# Patient Record
Sex: FEMALE | Race: WHITE | URBAN - NONMETROPOLITAN AREA
[De-identification: names, ages, dates, MRNs, and addresses within clinical notes are randomized per-mention and may not be internally consistent; named-entity substitution may affect disease eponyms.]

---

## 2017-02-20 LAB
ALT SERPL-CCNC: 42 IU/L (ref 6–31)
AST SERPL-CCNC: 58 IU/L (ref 10–40)
CREAT SERPL-MCNC: 0.72 MG/DL (ref 0.4–1)
GLUCOSE SERPL-MCNC: 119 MG/DL (ref 70–100)
INR PPP: 1.1 (ref 0.9–1.1)
POTASSIUM SERPL-SCNC: 4.4 MEQ/L (ref 3.4–5.1)

## 2017-02-21 ENCOUNTER — TRANSFERRED RECORDS (OUTPATIENT)
Dept: HEALTH INFORMATION MANAGEMENT | Facility: HOSPITAL | Age: 52
End: 2017-02-21

## 2017-02-21 ENCOUNTER — HOSPITAL ENCOUNTER (INPATIENT)
Facility: HOSPITAL | Age: 52
LOS: 3 days | Discharge: HOME | DRG: 885 | End: 2017-02-24
Attending: PSYCHIATRY & NEUROLOGY | Admitting: PSYCHIATRY & NEUROLOGY
Payer: MEDICARE

## 2017-02-21 DIAGNOSIS — F31.10 BIPOLAR I DISORDER, MOST RECENT EPISODE (OR CURRENT) MANIC (H): Primary | Chronic | ICD-10-CM

## 2017-02-21 PROCEDURE — 40000275 ZZH STATISTIC RCP TIME EA 10 MIN

## 2017-02-21 PROCEDURE — 25000132 ZZH RX MED GY IP 250 OP 250 PS 637: Mod: GY | Performed by: NURSE PRACTITIONER

## 2017-02-21 PROCEDURE — 94640 AIRWAY INHALATION TREATMENT: CPT

## 2017-02-21 PROCEDURE — 25000308 HC RX OP HPI UCR WEL MED 250 IP 250: Performed by: NURSE PRACTITIONER

## 2017-02-21 PROCEDURE — 12400000 ZZH R&B MH

## 2017-02-21 PROCEDURE — 99222 1ST HOSP IP/OBS MODERATE 55: CPT | Mod: AI | Performed by: NURSE PRACTITIONER

## 2017-02-21 PROCEDURE — A9270 NON-COVERED ITEM OR SERVICE: HCPCS | Mod: GY | Performed by: NURSE PRACTITIONER

## 2017-02-21 RX ORDER — TRAZODONE HYDROCHLORIDE 50 MG/1
50 TABLET, FILM COATED ORAL
Status: DISCONTINUED | OUTPATIENT
Start: 2017-02-21 | End: 2017-02-24 | Stop reason: HOSPADM

## 2017-02-21 RX ORDER — OLANZAPINE 10 MG/1
10 TABLET ORAL
Status: DISCONTINUED | OUTPATIENT
Start: 2017-02-21 | End: 2017-02-24 | Stop reason: HOSPADM

## 2017-02-21 RX ORDER — HYDROXYZINE HYDROCHLORIDE 25 MG/1
25-50 TABLET, FILM COATED ORAL EVERY 4 HOURS PRN
Status: DISCONTINUED | OUTPATIENT
Start: 2017-02-21 | End: 2017-02-24 | Stop reason: HOSPADM

## 2017-02-21 RX ORDER — ALBUTEROL SULFATE 0.83 MG/ML
1 SOLUTION RESPIRATORY (INHALATION) EVERY 4 HOURS PRN
COMMUNITY

## 2017-02-21 RX ORDER — TIOTROPIUM BROMIDE 18 UG/1
18 CAPSULE ORAL; RESPIRATORY (INHALATION) DAILY
Status: ON HOLD | COMMUNITY
End: 2017-08-31

## 2017-02-21 RX ORDER — DOCUSATE SODIUM 100 MG/1
100 CAPSULE, LIQUID FILLED ORAL 2 TIMES DAILY
Status: DISCONTINUED | OUTPATIENT
Start: 2017-02-21 | End: 2017-02-24 | Stop reason: HOSPADM

## 2017-02-21 RX ORDER — OLANZAPINE 10 MG/2ML
10 INJECTION, POWDER, FOR SOLUTION INTRAMUSCULAR
Status: DISCONTINUED | OUTPATIENT
Start: 2017-02-21 | End: 2017-02-24 | Stop reason: HOSPADM

## 2017-02-21 RX ORDER — ALBUTEROL SULFATE 0.83 MG/ML
1 SOLUTION RESPIRATORY (INHALATION) EVERY 4 HOURS PRN
Status: DISCONTINUED | OUTPATIENT
Start: 2017-02-21 | End: 2017-02-24 | Stop reason: HOSPADM

## 2017-02-21 RX ORDER — LITHIUM CARBONATE 300 MG/1
300 TABLET, FILM COATED, EXTENDED RELEASE ORAL EVERY 12 HOURS
Status: DISCONTINUED | OUTPATIENT
Start: 2017-02-21 | End: 2017-02-24 | Stop reason: HOSPADM

## 2017-02-21 RX ORDER — ACETAMINOPHEN 325 MG/1
650 TABLET ORAL EVERY 4 HOURS PRN
Status: DISCONTINUED | OUTPATIENT
Start: 2017-02-21 | End: 2017-02-24 | Stop reason: HOSPADM

## 2017-02-21 RX ORDER — BACLOFEN 10 MG/1
5 TABLET ORAL 3 TIMES DAILY PRN
Status: DISCONTINUED | OUTPATIENT
Start: 2017-02-21 | End: 2017-02-24 | Stop reason: HOSPADM

## 2017-02-21 RX ORDER — FLUTICASONE PROPIONATE 50 MCG
2 SPRAY, SUSPENSION (ML) NASAL DAILY
Status: DISCONTINUED | OUTPATIENT
Start: 2017-02-21 | End: 2017-02-24 | Stop reason: HOSPADM

## 2017-02-21 RX ORDER — ALENDRONATE SODIUM 70 MG/1
70 TABLET ORAL WEEKLY
Status: DISCONTINUED | OUTPATIENT
Start: 2017-02-21 | End: 2017-02-21

## 2017-02-21 RX ORDER — ALUMINA, MAGNESIA, AND SIMETHICONE 2400; 2400; 240 MG/30ML; MG/30ML; MG/30ML
30 SUSPENSION ORAL EVERY 4 HOURS PRN
Status: DISCONTINUED | OUTPATIENT
Start: 2017-02-21 | End: 2017-02-24 | Stop reason: HOSPADM

## 2017-02-21 RX ORDER — GABAPENTIN 300 MG/1
300 CAPSULE ORAL 2 TIMES DAILY
Status: DISCONTINUED | OUTPATIENT
Start: 2017-02-21 | End: 2017-02-24 | Stop reason: HOSPADM

## 2017-02-21 RX ADMIN — LITHIUM CARBONATE 300 MG: 300 TABLET, FILM COATED, EXTENDED RELEASE ORAL at 14:19

## 2017-02-21 RX ADMIN — LORATADINE AND PSEUDOEPHEDRINE 1 TABLET: 10; 240 TABLET, EXTENDED RELEASE ORAL at 14:19

## 2017-02-21 RX ADMIN — UMECLIDINIUM 1 PUFF: 62.5 AEROSOL, POWDER ORAL at 14:19

## 2017-02-21 RX ADMIN — Medication 5 MG: at 14:20

## 2017-02-21 RX ADMIN — GABAPENTIN 300 MG: 300 CAPSULE ORAL at 20:43

## 2017-02-21 RX ADMIN — VITAMIN D, TAB 1000IU (100/BT) 1000 UNITS: 25 TAB at 14:19

## 2017-02-21 RX ADMIN — DOCUSATE SODIUM 100 MG: 100 CAPSULE, LIQUID FILLED ORAL at 20:43

## 2017-02-21 RX ADMIN — OLANZAPINE 10 MG: 10 TABLET, FILM COATED ORAL at 02:13

## 2017-02-21 RX ADMIN — FLUTICASONE PROPIONATE 2 SPRAY: 50 SPRAY, METERED NASAL at 14:19

## 2017-02-21 RX ADMIN — ALBUTEROL SULFATE 2.5 MG: 2.5 SOLUTION RESPIRATORY (INHALATION) at 14:39

## 2017-02-21 ASSESSMENT — ACTIVITIES OF DAILY LIVING (ADL)
ORAL_HYGIENE: INDEPENDENT
SWALLOWING: 0-->SWALLOWS FOODS/LIQUIDS WITHOUT DIFFICULTY
DRESS: SCRUBS (BEHAVIORAL HEALTH);INDEPENDENT
GROOMING: INDEPENDENT
TOILETING: 0-->INDEPENDENT
RETIRED_EATING: 0-->INDEPENDENT
GROOMING: INDEPENDENT
AMBULATION: 0-->INDEPENDENT
COGNITION: 0 - NO COGNITION ISSUES REPORTED
FALL_HISTORY_WITHIN_LAST_SIX_MONTHS: NO
DRESS: SCRUBS (BEHAVIORAL HEALTH);INDEPENDENT
DRESS: 0-->INDEPENDENT
RETIRED_COMMUNICATION: 0-->UNDERSTANDS/COMMUNICATES WITHOUT DIFFICULTY
BATHING: 0-->INDEPENDENT
ORAL_HYGIENE: INDEPENDENT
TRANSFERRING: 0-->INDEPENDENT

## 2017-02-21 NOTE — PLAN OF CARE
Face to face end of shift report received from Baylee SAINZ RN. Rounding completed. Patient observed in her room.     Destiny Holt  2/21/2017  4:06 PM

## 2017-02-21 NOTE — PLAN OF CARE
Problem: Goal Outcome Summary  Goal: Goal Outcome Summary  Outcome: No Change  Pt denies SI HI SIB and anxiety. Pt reports depression rated a 7/10 d/t loosing her phone and bills. Pt reports she has pain to her left hip, neck and back rated a 7/10. Pt denies hallucinations but appears preoccupied. Pt cooperative with assessments    Problem: Anxiety (Adult)  Goal: Reduction/Resolution  Patient will verbalize decreased anxiety by discharge/transition of care.   Outcome: Improving  Pt denies anxiety at this time    Problem: Additional Goals: Nursing Focus  Goal: 1. Patient Goal: Nursing Focus  Pt. Will report no auditory hallucinations by discharge.   Outcome: No Change  Pt denies hallucinations but appears preoccupied  Goal: 2. Patient Goal: Nursing Focus  Pt. Will be able to obtain adequate amt of sleep at HS > 5 hrs/ day by d/c.   Outcome: Improving  Pt reports sleeping well  Goal: 3. Patient Goal: Nursing Focus  2/21/17 No weaning r/t psychosis; unpredicatble behavior. Treatment team to re-evaluate daily.   Outcome: No Change  No weaning at this time. Will reevaluate with treatment team daily

## 2017-02-21 NOTE — PHARMACY
"The following home medications were NOT continued on inpatient admission per \"Discontinuation of nonessential home medications during hospitalization\" policy: Alendronate    If a therapeutic holiday is deemed inappropriate per the prescriber, please notify the pharmacist regarding the medication order.    The pharmacist is available to answer any questions and/or concerns the patient may have regarding discontinuation of non-essential medications.    Please ensure that these medications are restarted as needed upon discharge via the medication reconciliation discharge process and included on the discharge medication reconciliation report.    Thank you,  Chiquita Keita    "

## 2017-02-21 NOTE — PROGRESS NOTES
"   02/21/17 0217   Patient Belongings   Did you bring any home meds/supplements to the hospital?  No   Patient Belongings clothing;keys;plastic bag;purse;shoes   Disposition of Belongings Belongings room   Belongings Search Yes   Clothing Search Yes   Second Staff Akila ALEXANDER   General Info Comment Black cigarrette case, tan boots, tan hospital boots, white tank top \"Free 02 Night\" printed on it, black sweat pants \"LOVE\" printed on them, brown courdoroy jacket, white BIC lighter, brown jim purse, zip lock bag with tobacco, pink flowered night shirt, black brush with brown hair band wrapped around handle, black dry erase marker, blue and white panties, black key chain with 3 keys attached, 6 Efferdent tabs in wrapper, 3 plastic prescription cards with nothing written on them.     All other belongings put in assigned cubby in belongings room.     I have reviewed my belongings list on admission and verify that it is correct.     Patient signature_______________________________    Second staff witness (if patient unable to sign) ______________________________       I have received all my belongings at discharge.    Patient signature________________________________    Sisi BUTLER  2/21/2017  2:24 AM      "

## 2017-02-21 NOTE — IP AVS SNAPSHOT
MRN:[id number]                      After Visit Summary   2/21/2017    Megan Godinez    MRN: [id number]           Thank you!     Thank you for choosing [facility] for your care. Our goal is always to provide you with excellent care. Hearing back from our patients is one way we can continue to improve our services. Please take a few minutes to complete the written survey that you may receive in the mail after you visit with us. Thank you!        Patient Information     Date Of Birth          1965        About your hospital stay     You were admitted on:  February 21, 2017 You last received care in the: [facility]    You were discharged on:  February 24, 2017       Who to Call     For medical emergencies, please call 911.  For non-urgent questions about your medical care, please call your primary care provider or clinic, [phone]          Attending Provider     Provider Specialty    Ricardo Pena MD Psychiatry    AlaLaxmi wheeler NP Licensed Mental Health       Primary Care Provider Office Phone # Fax #    Alexander Mayfield [phone]       [address] [place] [address]        Further instructions from your care team       Behavioral Discharge Planning and Instructions    Summary: Megan was admitted for hallucinations and inability for self care.    Main Diagnosis: bipolar type I disorder-current episode manic.    Major Treatments, Procedures and Findings: Stabilize with medications, connect with community programs.     Symptoms to Report: feeling more aggressive, increased confusion, losing more sleep, mood getting worse or thoughts of suicide    Lifestyle Adjustment: Take all medications as prescribed, meet with doctor/ medication provider, out patient therapist, , and [facility] worker as scheduled. Abstain from alcohol or any unprescribed drugs.     Psychiatry Follow-up:     [facility]  PCP- Alexander Mayfield  - March 10th, @ 1:45 pm.  Medication Management - Keeley Roberts - is out, meds will be managed by Dr. Mayfield until she returns.  [address] [place] [address]  Phone: [phone]   Fax: [phone]    Mobile Crisis Team/ Crisis Hotline: Call 1-396.686.4045 to reach [facility] Crisis Response.  [facility]  [address] [place], [place]  Phone: [phone]      Resources:   Crisis Intervention: 355.736.4759 or 056-879-7405 (TTY: 979.385.6658).  Call anytime for help.  National Nichols on Mental Illness (www.[place].chastity.org): 632.608.3791 or 385-849-9112.  Alcoholics Anonymous (www.alcoholics-anonymous.org): Check your phone book for your local chapter.  Suicide Awareness Voices of Education (SAVE) (www.save.org): 324-625-FBCD (9332)  National Suicide Prevention Line (www.mentalhealthmn.org): 431-961-LBEM (4697)  Mental Health Consumer/Survivor Network of [place] (www.mhcsn.net): 276.745.4445 or 278-292-8281  Mental Health Association of [place] (www.mentalhealth.org): 510.117.5991 or 035-011-3372    General Medication Instructions:   See your medication sheet(s) for instructions.   Take all medicines as directed.  Make no changes unless your doctor suggests them.   Go to all your doctor visits.  Be sure to have all your required lab tests. This way, your medicines can be refilled on time.  Do not use any drugs not prescribed by your doctor.  Avoid alcohol.    [place] Area:  [facility], [facility] stabilization [facility]- [phone]  [facility] Crisis Line: 1-198.365.9705  Advocates For Family Peace: 680-4176  Sexual Assault Program [facility]: 385.362.4342 or 1-785.838.1054  [facility]: 6-344-310-3412 Ext: 279       Calls answered Mon-Fri-8:00 am--4:30 pm    [place]:  [facility] for Family Peace: 1-596.568.8003  [facility] first call for help: 1-859.303.3177  [facility] Crisis Center:  (690) 832-2637      [place] Area:  Warm Line: 1-862.367.6295        "Calls answered Tuesday--Saturday 4:00 pm--10:00 pm  [facility] Crisis Line - 574-418-3216  [facility] 057-755-7410    [place] Statewide:  [place] Crisis and Referral Services: 1-869.676.3232  National Suicide Prevention Lifeline: 6-294-869-TALK (5667)   - rik1wifm- Text  Life  to [address]  First Call for Help:   [facility] Helpline- 1-683-SPTA-HELP     Pending Results     No orders found from 2017 to 2017.            Statement of Approval     Ordered          17 1129  I have reviewed and agree with all the recommendations and orders detailed in this document.  EFFECTIVE NOW     Approved and electronically signed by:  Laxmi Urbina NP             Admission Information     Date & Time Provider Department Dept. Phone    2017 Laxmi Urbina NP [facility] [phone]      Your Vitals Were     Blood Pressure Pulse Temperature Respirations Height Weight    118/63 83 98.2  F (36.8  C) (Tympanic) 16 1.626 m (5' 4\") 56.4 kg (124 lb 6.4 oz)    Pulse Oximetry BMI (Body Mass Index)                97% 21.35 kg/m2          [facility] [employer] Information     HangIt lets you send messages to your doctor, view your test results, renew your prescriptions, schedule appointments and more. To sign up, go to [email or web address] . Click on \"Log in\" on the left side of the screen, which will take you to the Welcome page. Then click on \"Sign up Now\" on the right side of the page.     You will be asked to enter the access code listed below, as well as some personal information. Please follow the directions to create your username and password.     Your access code is: [email or web address]  Expires: 2017 11:37 AM     Your access code will  in 90 days. If you need help or a new code, please call your [facility] clinic or [phone].        Care EveryWhere ID     This is your Care EveryWhere ID. This could be used by other organizations to access your [facility] medical records  [id number]         "   Review of your medicines      START taking        Dose / Directions    lithium 300 MG CR tablet   Commonly known as:  ESKALITH/LITHOBID   Used for:  Bipolar I disorder, most recent episode (or current) manic (H)        Dose:  300 mg   Take 1 tablet (300 mg) by mouth every 12 hours   Quantity:  60 tablet   Refills:  0         CONTINUE these medicines which may have CHANGED, or have new prescriptions. If we are uncertain of the size of tablets/capsules you have at home, strength may be listed as something that might have changed.        Dose / Directions    gabapentin 300 MG capsule   Commonly known as:  NEURONTIN   This may have changed:  medication strength   Used for:  Bipolar I disorder, most recent episode (or current) manic (H)        Dose:  300 mg   Take 1 capsule (300 mg) by mouth 2 times daily   Quantity:  60 capsule   Refills:  0         CONTINUE these medicines which have NOT CHANGED        Dose / Directions    albuterol (2.5 MG/3ML) 0.083% neb solution        Dose:  1 vial   Inhale 1 vial into the lungs every 4 hours as needed for shortness of breath / dyspnea or wheezing (1 puff q4hrs PRN)   Refills:  0       BACLOFEN PO        Dose:  5 mg   Take 5 mg by mouth 3 times daily as needed for muscle spasms   Refills:  0       fluticasone 50 MCG/ACT spray   Commonly known as:  FLONASE        Dose:  2 spray   Spray 2 sprays into both nostrils daily   Refills:  0       FOSAMAX PO        Dose:  70 mg   Take 70 mg by mouth once a week Patient takes every Sunday   Refills:  0       loratadine-pseudoePHEDrine  MG per 24 hr tablet   Commonly known as:  CLARITIN-D 24-hour        Dose:  1 tablet   Take 1 tablet by mouth daily as needed for allergies   Refills:  0       OMEPRAZOLE PO        Dose:  40 mg   Take 40 mg by mouth daily   Refills:  0       STOOL SOFTENER 100 MG tablet   Generic drug:  docusate sodium        Dose:  100 mg   Take 100 mg by mouth   Refills:  0       tiotropium 18 MCG capsule   Commonly  known as:  SPIRIVA        Dose:  18 mcg   Inhale 18 mcg into the lungs daily   Refills:  0       VITAMIN D3 PO        Dose:  1000 Units   Take 1,000 Units by mouth daily   Refills:  0            Where to get your medicines      These medications were sent to [id number] - [place], [place] - [address]  [address] [place] [address]     Phone:  [phone]     gabapentin 300 MG capsule    lithium 300 MG CR tablet                Protect others around you: Learn how to safely use, store and throw away your medicines at www.disposemymeds.org.             Medication List: This is a list of all your medications and when to take them. Check marks below indicate your daily home schedule. Keep this list as a reference.      Medications           Morning Afternoon Evening Bedtime As Needed    albuterol (2.5 MG/3ML) 0.083% neb solution   Inhale 1 vial into the lungs every 4 hours as needed for shortness of breath / dyspnea or wheezing (1 puff q4hrs PRN)   Last time this was given:  2.5 mg on 2/21/2017  2:39 PM                                BACLOFEN PO   Take 5 mg by mouth 3 times daily as needed for muscle spasms   Last time this was given:  5 mg on 2/23/2017  7:45 PM                                fluticasone 50 MCG/ACT spray   Commonly known as:  FLONASE   Spray 2 sprays into both nostrils daily   Last time this was given:  2 sprays on 2/24/2017  8:10 AM                                FOSAMAX PO   Take 70 mg by mouth once a week Patient takes every Sunday                                gabapentin 300 MG capsule   Commonly known as:  NEURONTIN   Take 1 capsule (300 mg) by mouth 2 times daily   Last time this was given:  300 mg on 2/24/2017  8:10 AM                                lithium 300 MG CR tablet   Commonly known as:  ESKALITH/LITHOBID   Take 1 tablet (300 mg) by mouth every 12 hours   Last time this was given:  300 mg on 2/24/2017  8:10 AM                                 loratadine-pseudoePHEDrine  MG per 24 hr tablet   Commonly known as:  CLARITIN-D 24-hour   Take 1 tablet by mouth daily as needed for allergies   Last time this was given:  1 tablet on 2/21/2017  2:19 PM                                OMEPRAZOLE PO   Take 40 mg by mouth daily   Last time this was given:  40 mg on 2/24/2017  6:33 AM                                STOOL SOFTENER 100 MG tablet   Take 100 mg by mouth   Generic drug:  docusate sodium                                tiotropium 18 MCG capsule   Commonly known as:  SPIRIVA   Inhale 18 mcg into the lungs daily                                VITAMIN D3 PO   Take 1,000 Units by mouth daily   Last time this was given:  1,000 Units on 2/24/2017  8:10 AM

## 2017-02-21 NOTE — PLAN OF CARE
Face to face end of shift report received from Shari JOHNSON RN. Rounding completed. [facility]    Baylee King  2/21/2017  7:31 AM

## 2017-02-21 NOTE — PLAN OF CARE
"Problem: Goal Outcome Summary  Goal: Goal Outcome Summary  Outcome: No Change  ADMISSION NOTE     Reason for admission:Hallucinations and inability to care for self.  Safety concerns: NA.  Risk for or history of violence: No.      Patient arrived on unit from [facility] ER accompanied by  on 2/21/2017  1:16 AM.   Status on arrival: Alert and oriented X 2   /80  Pulse 87  Temp 99.6  F (37.6  C) (Tympanic)  Resp 16  Ht 1.626 m (5' 4\")  Wt 56.4 kg (124 lb 6.4 oz)  SpO2 98%  BMI 21.35 kg/m2  Patient given tour of unit and Welcome to  unit papers given to patient, wanding completed, belongings inventoried, and admission assessment completed.   Patient's legal status on arrival is 72 hour hold. Appropriate legal rights discussed with and copy given to patient. Patient Bill of Rights discussed with and copy given to patient.   Patient denies SI, HI, and thoughts of self harm and contracts for safety while on unit.    Pt. Denies anxiety and depression. Pt. Reports she hears voices \"[place] voices\" and appears responding. \"Be quiet carmel. I'm trying to talk to this girl.\" Pt. Will not elaborate what the [place] voices are saying at this time. When this writer asked Pt. How she got here Pt. Replied \"I was spinning in a time machine.\" Pt. Cooperative with skin assessment. Pt. Has 3 cm round red gaby to right forearm, with no c/o pain. Pt. Reports she lives by herself in [place]. Reported her support system is mother and father. Pt. Has 21 mg patch to left arm from ER. She smokes 1/2 Pk/day of cigarettes, and occasionally smokes marijuana. Pt. Denies use of other illicite drugs or ETOH. Pt. Can verbalize prescribed medications but reports she does not know the last time she took them.  Pt. reportsd her pharmacy [facility] in [place]. Pt. Brought to room in -ICU. Pt. Walked backward from her bed and through lounge to hit exit doors. Pt. Then ran to room. This writer called D/C ER for medication " "administration Hx. This writer spoke with Kristan HARRY who reported Pt. Was administered 30 mg Lactulose and a 21 mg nicotine patch. Pt. Administered PRN zyprexa po @ 0213. Security accompanied during administration. Pt. cooperative with medication. Pt. States \"I don't want to get hit. I'll stay in here if I get hit.\" Pt. reassured no one was going to hit her. Pt. attempting to rest in bed, continues to talk to people that are not there.       Shari Vergara  2/21/2017  3:24 AM    Problem: Anxiety (Adult)  Goal: Reduction/Resolution  Patient will verbalize decreased anxiety by discharge/transition of care.   Outcome: No Change  Goal not met this AM see goal summary.     Problem: Additional Goals: Nursing Focus  Goal: 1. Patient Goal: Nursing Focus  Pt. Will report no auditory hallucinations by discharge.   Outcome: No Change  Goal not met see goal summary.   Goal: 2. Patient Goal: Nursing Focus  Pt. Will be able to obtain adequate amt of sleep at HS > 5 hrs/ day by d/c.   Outcome: No Change  Goal not met this Noc shift.       "

## 2017-02-21 NOTE — PROGRESS NOTES
"   02/21/17 0217   Patient Belongings   Did you bring any home meds/supplements to the hospital?  No   Patient Belongings clothing;keys;plastic bag;purse;shoes   Disposition of Belongings Belongings room   Belongings Search Yes   Clothing Search Yes   Second Staff Akila ALEXANDER   General Info Comment Black cigarrette case, tan boots, tan hospital boots, white tank top \"Free 02 Night\" printed on it, black sweat pants \"LOVE\" printed on them, brown courdoroy jacket, white BIC lighter, brown jim purse, zip lock bag with tobacco, pink flowered night shirt, black brush with brown hair band wrapped around handle, black dry erase marker, blue and white panties, black key chain with 3 keys attached, 6 Efferdent tabs in wrapper, 3 plastic prescription cards with nothing written on them.    List items sent to safe: None.  All other belongings put in assigned cubby in belongings room.     I have reviewed my belongings list on admission and verify that it is correct.     Patient signature_______________________________    Second staff witness (if patient unable to sign) ______________________________       I have received all my belongings at discharge.    Patient signature________________________________    Sisi BUTLER  2/21/2017  2:24 AM      "

## 2017-02-21 NOTE — IP AVS SNAPSHOT
[facility]    [address] [place] [address]    Phone:  [phone]    Fax:  [phone]                                       After Visit Summary   2/21/2017    Megan Godinez    MRN: [id number]           After Visit Summary Signature Page     I have received my discharge instructions, and my questions have been answered. I have discussed any challenges I see with this plan with the nurse or doctor.    ..........................................................................................................................................  Patient/Patient Representative Signature      ..........................................................................................................................................  Patient Representative Print Name and Relationship to Patient    ..................................................               ................................................  Date                                            Time    ..........................................................................................................................................  Reviewed by Signature/Title    ...................................................              ..............................................  Date                                                            Time

## 2017-02-21 NOTE — PLAN OF CARE
"Social Service Psychosocial Assessment  Presenting Problem:   Patient was admitted with hallucinations and inability to care for self. ED note states pt was brought in by a deputy for dancing in the street and talking to someone who was not there. [place] reports that a similar incident happened about a year ago. States she was admitted because of dehydration, stress, and gluten.  Marital Status:   Single  Spouse / Children:    No children  Psychiatric TX HX: Was here in 2015.  [place] reports that a similar incident happened about a year ago.   Suicide Risk Assessment:  Denies SI on admission. Denies any past suicide attempts. Denies SI today.   Access to Lethal Means (explain):   No access to lethal means   Family Psych HX:   None reported   A & Ox:   x3  Medication Adherence:   Unknown  Medical Issues:   See H&P  Visual  Motor Functioning:   Okay  Communication Skills /Needs:   Okay- Nursing notes state pt reports hearing \"[place] voices\" States I hear spiritual voices once in awhile    Ethnicity:   White     Spirituality/Scientology Affiliation:   Presybeterian  Clergy Request:   No   History:  None reported   Living Situation:   Lives alone in her own apartment in [place] s:  Independent   Education:  Dropped out in 11th grade, did her CNA course  Financial Situation:   SSDI  Occupation:  [employer]- States she used to work as a    Leisure & Recreation:  Walking, being outside  Childhood History:   Reports she was born in [place] and raised in [place], moved to [place] when she was 10-12 years old due to dad's work - Previously states she has 3 sisters one sister remaining- parents are  - reports a pretty good childhood.  Trauma Abuse HX:   None reported   Relationship / Sexuality:   None reported  Substance Use/ Abuse:   Smoke marijuana and drinks occasionally  Chemical Dependency Treatment HX:  None reported   Legal Issues:   None reported   Significant Life Events:   States she is " a little poor, was feeling down from the holidays, and she lost her cell phone  Strengths:   Accepting of services, In a safe environment   Challenges /Limitation:   Lacks insight, financial struggles   Patient Support Contact (Include name, relationship, number, and summary of conversation):   Patient has a release signed for her father Duane and mother Amparo- though stated they were  last admit.   Interventions:        Community-Based Programs- [facility], would benefit if interested     Medical/Dental Care- PCP- Alexander Mayfield  [phone]    CD Evaluation/Rule 25/Aftercare- Recommended, if pt interested     Medication Management- Referral needed     Individual Therapy- Referral needed     Insurance Coverage- Medicare     Suicide Risk Assessment- Denies SI on admission. Denies any past suicide attempts. Denies SI today.     High Risk Safety Plan- Talk to supports; Call crisis lines; Go to local ER if feeling suicidal.

## 2017-02-21 NOTE — H&P
"Psychiatric Eval/H&P  Patient Name: Megan Godinez   YOB: 1965  Age: 51 year old  [id number]    Primary Physician: Alexander Mayfield   Completed By: Laxmi Urbina NP     CC: \"A little dehydrated, a little stressed and some gluten\"    HPI   Megan Godinez is a 51 year old single  female who presented via [facility] ER after police were called over concerns of her acting strangely with disorganized behavior in the parking lot of a local business. She tells me that she had some money issues and was getting over the holidays but she is better now. She denies any current auditory hallucinations. Megan is disorganized and rambles. She does not respond appropriately to questions when asked.      SPECIFIC SYMPTOM HISTORY  Sleep:no issues .  Recent appetite change: No.  Recent weight change: No.  Special diet: No.  Other nutritional concerns: no.  Psychotic symptoms (subjective): No hallucinations..nonedenies symptoms of psychosis     PMFSPH     Past Psychiatric History: Megan was admitted here in 2015 for similar presentation. She denies any hospitalization since that time to the ER. She does have a diagnosis of schizoaffective disorder and bipolar I disorder. She denies any history of abuse, trauma, TBI, seizure, or suicide attempt.      Social History: She reports she was born in [place] and was raised in the [facility]. She says she has three sisters with whom she has contact. She then corrects herself and said she has not talked to them as she lost her phone. She denies ever being  or having children.   Education, school, occupation, social/peer relations, hobbies/recreational interests,  history, legal history,      Chemical Use History: Megan denies ever having been to CD treatment. She admits to marijuana use but denies any frequent alcohol use or use of other illicit drugs.      Family Psychiatric History: Denies any family history of mental " "health issues.         Medical History and ROS  Prescription Medications as of 2/21/2017             BACLOFEN PO Take 5 mg by mouth 3 times daily as needed for muscle spasms    tiotropium (SPIRIVA) 18 MCG capsule Inhale 18 mcg into the lungs daily    albuterol (2.5 MG/3ML) 0.083% neb solution Inhale 1 vial into the lungs every 4 hours as needed for shortness of breath / dyspnea or wheezing (1 puff q4hrs PRN)    OMEPRAZOLE PO Take 40 mg by mouth daily     fluticasone (FLONASE) 50 MCG/ACT nasal spray Spray 2 sprays into both nostrils daily    Alendronate Sodium (FOSAMAX PO) Take 70 mg by mouth once a week Patient takes every Sunday    loratadine-pseudoePHEDrine (CLARITIN-D 24-HOUR)  MG per tablet Take 1 tablet by mouth daily as needed for allergies    docusate sodium (STOOL SOFTENER) 100 MG tablet Take 100 mg by mouth    Cholecalciferol (VITAMIN D3 PO) Take 1,000 Units by mouth daily    GABAPENTIN PO Take 300 mg by mouth 2 times daily      Facility Administered Medications as of 2/21/2017             hydrOXYzine (ATARAX) tablet 25-50 mg Take 1-2 tablets (25-50 mg) by mouth every 4 hours as needed for anxiety    acetaminophen (TYLENOL) tablet 650 mg Take 2 tablets (650 mg) by mouth every 4 hours as needed for mild pain    alum & mag hydroxide-simethicone (MYLANTA ES/MAALOX  ES) suspension 30 mL Take 30 mLs by mouth every 4 hours as needed for indigestion    magnesium hydroxide (MILK OF MAGNESIA) suspension 30 mL Take 30 mLs by mouth nightly as needed for constipation    traZODone (DESYREL) tablet 50 mg Take 1 tablet (50 mg) by mouth nightly as needed for sleep    OLANZapine (zyPREXA) tablet 10 mg Take 1 tablet (10 mg) by mouth every 2 hours as needed for agitation (associated with psychosis or jh)    Linked Group 1:  \"Or\" Linked Group Details     OLANZapine (zyPREXA) injection 10 mg Inject 10 mg into the muscle every 2 hours as needed for agitation (associated with psychosis or jh)    Linked Group 1:  \"Or\" " "Linked Group Details     nicotine polacrilex (NICORETTE) gum 2-4 mg Place 1-2 each (2-4 mg) inside cheek every hour as needed for other (nicotine withdrawal symptoms)    albuterol neb solution 2.5 mg Take 3 mLs (2.5 mg) by nebulization every 4 hours as needed for shortness of breath / dyspnea or wheezing (1 puff q4hrs PRN)    alendronate (FOSAMAX) tablet 70 mg Take 1 tablet (70 mg) by mouth once a week    baclofen (LIORESAL) half-tab 5 mg Take 1 half-tab (5 mg) by mouth 3 times daily as needed for muscle spasms    cholecalciferol (vitamin D) tablet 1,000 Units Take 1 tablet (1,000 Units) by mouth daily    docusate sodium (COLACE) capsule 100 mg Take 1 capsule (100 mg) by mouth 2 times daily    fluticasone (FLONASE) 50 MCG/ACT spray 2 spray Spray 2 sprays into both nostrils daily    gabapentin (NEURONTIN) capsule 300 mg Take 1 capsule (300 mg) by mouth 2 times daily    loratadine-pseudoePHEDrine (CLARITIN-D 24-hour)  MG per 24 hr tablet 1 tablet Take 1 tablet by mouth daily as needed for allergies    omeprazole (priLOSEC) CR capsule 40 mg Take 2 capsules (40 mg) by mouth every morning (before breakfast)    umeclidinium (INCRUSE ELLIPTA) 62.5 MCG/INH oral inhaler 1 puff Inhale 1 puff into the lungs daily          Allergies   Allergen Reactions     Bee Venom Swelling     Severe and went to hospital per pt.     Gluten Meal Other (See Comments)     Pt stated \"not really an allergy though, just borderline, and I follow it.\" Reports that she \"can't focus\" when has gluten.     Metal [Staples] Hives     Pt reported reaction to \"cheap metals.\"      No past medical history on file.  No past surgical history on file.      Physical Exam    Constitutional: oriented to person, place, and time.  appears well-developed and well-nourished.   HENT:   Head: Normocephalic and atraumatic.   Right Ear: External ear normal.   Left Ear: External ear normal.   Nose: Nose normal.   Mouth/Throat: Oropharynx is clear and moist. No " "oropharyngeal exudate.   Eyes: Conjunctivae and EOM are normal. Pupils are equal, round, and reactive to light. Right eye exhibits no discharge. Left eye exhibits no discharge. No scleral icterus.   Neck: Normal range of motion. Neck supple. No JVD present. No tracheal deviation present. No thyromegaly present.   Cardiovascular: Normal rate, regular rhythm, normal heart sounds and intact distal pulses. Exam reveals no gallop and no friction rub.   No murmur heard.  Pulmonary/Chest: Effort normal and breath sounds normal. No stridor. No respiratory distress.  no wheezes. no rales. no tenderness.   Abdominal: Soft. Bowel sounds are normal.  no distension and no mass. There is no tenderness. There is no rebound and no guarding.  Skin: Dry, intact, no open areas, rashes, moles of concern    Review of Systems:  Constitution: No weight loss, fever, night sweats  Skin: No rashes, pruritus or open wounds  Neuro: No headaches or seizure activity.  Psych:  See HPI  Eyes: No vision changes.  ENT: No problems chewing or swallowing.   Musculoskeletal: No muscle pain, joint pain or swelling   Respiratory: No cough or dyspnea  Cardiovascular:  No chest pain,  palpitations or fainting  Gastrointestinal:  No abdominal pain, nausea, vomiting or change in bowel habits         MSE/PSYCH  PSYCHIATRIC EXAM  /80  Pulse 87  Temp 99.6  F (37.6  C) (Tympanic)  Resp 16  Ht 1.626 m (5' 4\")  Wt 56.4 kg (124 lb 6.4 oz)  SpO2 98%  BMI 21.35 kg/m2  -Appearance/Behavior:   Neatly groomed  {attitude:anxious  -Motor: restless.  -Gait: Normal.    -Abnormal involuntary movements: none.  -Mood: anxious.  -Affect: Anxious/Nervous.  -Speech: Pressured .                 -Thought process/associations: Tangential.  -Thought content: rambling.  -Perceptual disturbances: No hallucinations..              -Suicidal/Homicidal Ideation: denies  -Judgment: Poor.  -Insight: Fair.  *Orientation: person.  *Memory: intact.  *Attention:short/  *Language: fluent, " no aphasias, able to repeat phrases and name objects. Vocab intact.  *Fund of information: appropriate for education.  *Cognitive functioning estimate: 1 - slightly impaired.     Labs: No results found for this or any previous visit (from the past 48 hour(s)).  Did have some elevated liver functions and an elevated ammonia level. Will redraw these in the morning.     Assessment/Impression: This is a 51 year old yo female with a history of schizoaffective versus bipolar disorder. She is pleasant and cooperative with the assessment but her speech is a bit pressured and her thought content is rambling and tangential. She does redirect and answer questions quite well. She is denying any hallucinations. She is quite disorganized in behavior and is rather restless. Will resume her outpatient medications. And start lithium as she does show some hepatic issues with chronic hep C.   Educated regarding medication indications, risks, benefits, side effects, contraindications and possible interactions. Verbally expressed understanding.     DX: bipolar type I disorder-current episode manic     Plan:  Admit to Unit: [address] [place] [address]  Attending: JOSELITO Leigh  Patient is:72 hour mental health hold  Other routine labs were notable for + positive for THC  Monitor for target symptoms: decreased behavior and thought disorganization  Provide a safe environment and therapeutic milieu.   Re-Start/Start: medications per home  Start lithium     Anticipated length of stay: 3-5 days       JOSELITO Leigh

## 2017-02-22 LAB
ALBUMIN SERPL-MCNC: 3.7 G/DL (ref 3.4–5)
ALP SERPL-CCNC: 64 U/L (ref 40–150)
ALT SERPL W P-5'-P-CCNC: 41 U/L (ref 0–50)
AMMONIA PLAS-SCNC: 23 UMOL/L (ref 10–50)
AST SERPL W P-5'-P-CCNC: 26 U/L (ref 0–45)
BILIRUB DIRECT SERPL-MCNC: <0.1 MG/DL (ref 0–0.2)
BILIRUB SERPL-MCNC: 0.2 MG/DL (ref 0.2–1.3)
PROT SERPL-MCNC: 7.1 G/DL (ref 6.8–8.8)

## 2017-02-22 PROCEDURE — 80076 HEPATIC FUNCTION PANEL: CPT | Performed by: NURSE PRACTITIONER

## 2017-02-22 PROCEDURE — A9270 NON-COVERED ITEM OR SERVICE: HCPCS | Mod: GY | Performed by: NURSE PRACTITIONER

## 2017-02-22 PROCEDURE — 25000132 ZZH RX MED GY IP 250 OP 250 PS 637: Mod: GY | Performed by: NURSE PRACTITIONER

## 2017-02-22 PROCEDURE — 82140 ASSAY OF AMMONIA: CPT | Performed by: NURSE PRACTITIONER

## 2017-02-22 PROCEDURE — 99232 SBSQ HOSP IP/OBS MODERATE 35: CPT | Performed by: NURSE PRACTITIONER

## 2017-02-22 PROCEDURE — 12400000 ZZH R&B MH

## 2017-02-22 PROCEDURE — 36415 COLL VENOUS BLD VENIPUNCTURE: CPT | Performed by: NURSE PRACTITIONER

## 2017-02-22 RX ORDER — NICOTINE 21 MG/24HR
1 PATCH, TRANSDERMAL 24 HOURS TRANSDERMAL DAILY
Status: DISCONTINUED | OUTPATIENT
Start: 2017-02-22 | End: 2017-02-24 | Stop reason: HOSPADM

## 2017-02-22 RX ADMIN — GABAPENTIN 300 MG: 300 CAPSULE ORAL at 08:18

## 2017-02-22 RX ADMIN — ACETAMINOPHEN 650 MG: 325 TABLET, FILM COATED ORAL at 20:15

## 2017-02-22 RX ADMIN — UMECLIDINIUM 1 PUFF: 62.5 AEROSOL, POWDER ORAL at 08:19

## 2017-02-22 RX ADMIN — VITAMIN D, TAB 1000IU (100/BT) 1000 UNITS: 25 TAB at 08:48

## 2017-02-22 RX ADMIN — GABAPENTIN 300 MG: 300 CAPSULE ORAL at 20:11

## 2017-02-22 RX ADMIN — LITHIUM CARBONATE 300 MG: 300 TABLET, FILM COATED, EXTENDED RELEASE ORAL at 08:18

## 2017-02-22 RX ADMIN — LITHIUM CARBONATE 300 MG: 300 TABLET, FILM COATED, EXTENDED RELEASE ORAL at 20:12

## 2017-02-22 RX ADMIN — HYDROXYZINE HYDROCHLORIDE 50 MG: 25 TABLET ORAL at 06:28

## 2017-02-22 RX ADMIN — FLUTICASONE PROPIONATE 2 SPRAY: 50 SPRAY, METERED NASAL at 08:18

## 2017-02-22 RX ADMIN — DOCUSATE SODIUM 100 MG: 100 CAPSULE, LIQUID FILLED ORAL at 08:18

## 2017-02-22 RX ADMIN — OMEPRAZOLE 40 MG: 20 CAPSULE, DELAYED RELEASE ORAL at 06:35

## 2017-02-22 RX ADMIN — Medication 5 MG: at 06:28

## 2017-02-22 RX ADMIN — DOCUSATE SODIUM 100 MG: 100 CAPSULE, LIQUID FILLED ORAL at 20:12

## 2017-02-22 RX ADMIN — Medication 5 MG: at 18:00

## 2017-02-22 RX ADMIN — ACETAMINOPHEN 650 MG: 325 TABLET, FILM COATED ORAL at 08:48

## 2017-02-22 RX ADMIN — NICOTINE 1 PATCH: 14 PATCH, EXTENDED RELEASE TRANSDERMAL at 16:39

## 2017-02-22 ASSESSMENT — ACTIVITIES OF DAILY LIVING (ADL)
DRESS: SCRUBS (BEHAVIORAL HEALTH);INDEPENDENT
GROOMING: INDEPENDENT
DRESS: SCRUBS (BEHAVIORAL HEALTH);INDEPENDENT
ORAL_HYGIENE: INDEPENDENT
ORAL_HYGIENE: INDEPENDENT
GROOMING: INDEPENDENT

## 2017-02-22 NOTE — PLAN OF CARE
Problem: Goal Outcome Summary  Goal: Goal Outcome Summary  Outcome: No Change  Pt denies SI HI SIB and anxiety. Pt denies hallucinations but appears preoccupied. pts responses are delayed. Pt reports pain to lower back rated a 9/10. Pt received PRN tylenol at 0848. Pt showered this shift. Pt encouraged to attend groups. Pt cooperative with assessments and compliant with medications.    Problem: Anxiety (Adult)  Goal: Reduction/Resolution  Patient will verbalize decreased anxiety by discharge/transition of care.   Outcome: Improving  Pt denies anxiety    Problem: Thought Process Alteration (Adult)  Goal: Improved Thought Process  Patient will report no hallucinations by discharge/transition of care.   Outcome: Improving  Pt denies hallucinations but appears preoccupied

## 2017-02-22 NOTE — PLAN OF CARE
Face to face end of shift report received from Baylee SAINZ RN. Rounding completed. Patient observed in the lounge.     Destiny Holt  2/22/2017  4:01 PM

## 2017-02-22 NOTE — PLAN OF CARE
0628--given baclofen 2.5 mg po for muscle spasms in arms / hands and given vistaril 50 mg po for anxiety rated at 5 on 0-10 scale.

## 2017-02-22 NOTE — PLAN OF CARE
Problem: Goal Outcome Summary  Goal: Goal Outcome Summary  Slept through the night with position changes noted.

## 2017-02-22 NOTE — PLAN OF CARE
"Problem: Goal Outcome Summary  Goal: Goal Outcome Summary  Outcome: No Change  Patient has been calm and cooperative. She rated her depression a \"7/10\". She denied suicidal ideation and hallucinations. She was distractible and appeared to be responding to internal stimuli. During the nursing assessment, she was noted to be staring off into space. She would intermittently maintain eye contact with this writer. Her affect was flat and speech was tangential.     Problem: Anxiety (Adult)  Goal: Reduction/Resolution  Patient will verbalize decreased anxiety by discharge/transition of care.   Outcome: Improving  Patient denied anxiety.     Problem: Additional Goals: Nursing Focus  Goal: 1. Patient Goal: Nursing Focus  Pt. Will report no auditory hallucinations by discharge.   Outcome: Improving  Patient denies auditory hallucinations.       "

## 2017-02-22 NOTE — PROGRESS NOTES
"[facility]  Psychiatric Progress Note      Impression:   Megan Godinez is a 51 year old single  female who presented via [facility] ER after police were called over concerns of her acting strangely with disorganized behavior in the parking lot of a local business. She indicated that she had some money issues and was getting over the holidays. She denied any current auditory hallucinations. Presented disorganized.    Megan demonstrates some mild improvement today. She denies any history of mental illness and tells me that she just gets \"moved by the spirit\" whenever she gets stressed out and does not get enough sleep. She states, \"then I come here, take a nap and it is all good.\" Gives me a list of medications previously tried, with the only possible psych med being Gabapentin. Has no outpatient psych services but does indicate that a psychologist where her PCP works has offered to see her, which she is considering. Is agreeable to continuing Lithium and understands that she is on a hold. She does ask if she can leave Friday morning, which is right after the hold expires. Reassured her that we would arrange transport if needed. Very pleasant. Denies SI/HI, auditory or visual hallucinations, depression or anxiety. She did sleep well last night. Nursing reporting some evidence of preoccupation and delayed responses.          DIagnoses:     Bipolar I disorder, Current episode manic, severe with psychosis    Attestation:  Patient has been seen and evaluated by me,  Yuniel Em NP          Interim History:   The patient's care was discussed with the treatment team and chart notes were reviewed.          Medications:   I have reviewed this patient's current medications  Prescription Medications as of 2/22/2017             BACLOFEN PO Take 5 mg by mouth 3 times daily as needed for muscle spasms    tiotropium (SPIRIVA) 18 MCG capsule Inhale 18 mcg into the lungs daily    " "albuterol (2.5 MG/3ML) 0.083% neb solution Inhale 1 vial into the lungs every 4 hours as needed for shortness of breath / dyspnea or wheezing (1 puff q4hrs PRN)    OMEPRAZOLE PO Take 40 mg by mouth daily     fluticasone (FLONASE) 50 MCG/ACT nasal spray Spray 2 sprays into both nostrils daily    Alendronate Sodium (FOSAMAX PO) Take 70 mg by mouth once a week Patient takes every Sunday    loratadine-pseudoePHEDrine (CLARITIN-D 24-HOUR)  MG per tablet Take 1 tablet by mouth daily as needed for allergies    docusate sodium (STOOL SOFTENER) 100 MG tablet Take 100 mg by mouth    Cholecalciferol (VITAMIN D3 PO) Take 1,000 Units by mouth daily    GABAPENTIN PO Take 300 mg by mouth 2 times daily      Facility Administered Medications as of 2/22/2017             nicotine Patch in Place Place onto the skin every 8 hours    nicotine patch REMOVAL Starting on 2/23/2017. Place onto the skin daily    nicotine (NICODERM CQ) 14 MG/24HR 24 hr patch 1 patch Place 1 patch onto the skin daily    hydrOXYzine (ATARAX) tablet 25-50 mg Take 1-2 tablets (25-50 mg) by mouth every 4 hours as needed for anxiety    acetaminophen (TYLENOL) tablet 650 mg Take 2 tablets (650 mg) by mouth every 4 hours as needed for mild pain    alum & mag hydroxide-simethicone (MYLANTA ES/MAALOX  ES) suspension 30 mL Take 30 mLs by mouth every 4 hours as needed for indigestion    magnesium hydroxide (MILK OF MAGNESIA) suspension 30 mL Take 30 mLs by mouth nightly as needed for constipation    traZODone (DESYREL) tablet 50 mg Take 1 tablet (50 mg) by mouth nightly as needed for sleep    OLANZapine (zyPREXA) tablet 10 mg Take 1 tablet (10 mg) by mouth every 2 hours as needed for agitation (associated with psychosis or jh)    Linked Group 1:  \"Or\" Linked Group Details     OLANZapine (zyPREXA) injection 10 mg Inject 10 mg into the muscle every 2 hours as needed for agitation (associated with psychosis or jh)    Linked Group 1:  \"Or\" Linked Group Details     " "nicotine polacrilex (NICORETTE) gum 2-4 mg Place 1-2 each (2-4 mg) inside cheek every hour as needed for other (nicotine withdrawal symptoms)    albuterol neb solution 2.5 mg Take 3 mLs (2.5 mg) by nebulization every 4 hours as needed for shortness of breath / dyspnea or wheezing (1 puff q4hrs PRN)    baclofen (LIORESAL) half-tab 5 mg Take 1 half-tab (5 mg) by mouth 3 times daily as needed for muscle spasms    cholecalciferol (vitamin D) tablet 1,000 Units Take 1 tablet (1,000 Units) by mouth daily    docusate sodium (COLACE) capsule 100 mg Take 1 capsule (100 mg) by mouth 2 times daily    fluticasone (FLONASE) 50 MCG/ACT spray 2 spray Spray 2 sprays into both nostrils daily    gabapentin (NEURONTIN) capsule 300 mg Take 1 capsule (300 mg) by mouth 2 times daily    loratadine-pseudoePHEDrine (CLARITIN-D 24-hour)  MG per 24 hr tablet 1 tablet Take 1 tablet by mouth daily as needed for allergies    omeprazole (priLOSEC) CR capsule 40 mg Take 2 capsules (40 mg) by mouth every morning (before breakfast)    umeclidinium (INCRUSE ELLIPTA) 62.5 MCG/INH oral inhaler 1 puff Inhale 1 puff into the lungs daily    lithium (ESKALITH/LITHOBID) CR tablet 300 mg Take 1 tablet (300 mg) by mouth every 12 hours        10 point ROS completed with positives noted above       Allergies:     Allergies   Allergen Reactions     Bee Venom Swelling     Severe and went to hospital per pt.     Gluten Meal Other (See Comments)     Pt stated \"not really an allergy though, just borderline, and I follow it.\" Reports that she \"can't focus\" when has gluten.     Metal [Staples] Hives     Pt reported reaction to \"cheap metals.\"             Psychiatric Examination:   /76  Pulse 74  Temp 98.2  F (36.8  C) (Tympanic)  Resp 16  Ht 1.626 m (5' 4\")  Wt 56.4 kg (124 lb 6.4 oz)  SpO2 96%  BMI 21.35 kg/m2  Weight is 124 lbs 6.4 oz  Body mass index is 21.35 kg/(m^2).    Appearance:  awake, alert and dressed in hospital scrubs  Attitude:  " cooperative  Eye Contact:  good  Mood:  better  Affect:  appropriate and in normal range  Speech:  clear, coherent  Psychomotor Behavior:  no evidence of tardive dyskinesia, dystonia, or tics  Thought Process:  linear, goal oriented and disorganized but improved  Associations:  no loose associations  Thought Content:  no evidence of suicidal ideation or homicidal ideation; possibly preoccupied by internal stimulation  Insight:  limited  Judgment:  limited  Oriented to:  time, person, and place  Attention Span and Concentration:  fair  Recent and Remote Memory:  fair  Fund of Knowledge: appropriate  Muscle Strength and Tone: normal  Gait and Station: Normal           Labs:     Results for orders placed or performed during the hospital encounter of 02/21/17   Hepatic panel   Result Value Ref Range    Bilirubin Direct <0.1 0.0 - 0.2 mg/dL    Bilirubin Total 0.2 0.2 - 1.3 mg/dL    Albumin 3.7 3.4 - 5.0 g/dL    Protein Total 7.1 6.8 - 8.8 g/dL    Alkaline Phosphatase 64 40 - 150 U/L    ALT 41 0 - 50 U/L    AST 26 0 - 45 U/L   Ammonia   Result Value Ref Range    Ammonia 23 10 - 50 umol/L          Plan:    Continue Lithium CR 300mg bid.

## 2017-02-22 NOTE — PLAN OF CARE
Face to face end of shift report received from Arun HUSAIN RN. Rounding completed. Patient observed in room     Baylee King  2/22/2017  7:36 AM

## 2017-02-22 NOTE — PLAN OF CARE
Face to face end of shift report received from pm nurse. Rounding completed. Patient observed in bed with eyes closed. Respirations regular unlabored..     Saira Aiken  2/22/2017  12:42 AM

## 2017-02-22 NOTE — PLAN OF CARE
"Problem: Discharge Planning  Goal: Discharge Planning (Adult, OB, Behavioral, Peds)  Outcome: Improving  Talked with pt this morning, she seems more clear than yesterday. Has questions about her hold- tried to answer these, concerns about her cat being home alone, wants to look at phone for numbers of friends- checked belongings pt did not come in with a cell phone, wants to know if she can wear her boots or if we have slippers or sandals she can wear because her feet are sore- looked for slippers or sandals and we are out- spoke with pt's nurse who stated she could wear her slipper like boots-gave these to her. States she doesn't have transportation to get home, talked with her about the [employer], states she has used the bus before and they have a stop in [place] so she would be comfortable with this. Says about once a year she has a \"spirtual voice\" from being tired and stressed out and comes to the hospital for her \"vacation\"       "

## 2017-02-23 PROCEDURE — 25000132 ZZH RX MED GY IP 250 OP 250 PS 637: Mod: GY | Performed by: NURSE PRACTITIONER

## 2017-02-23 PROCEDURE — 12400000 ZZH R&B MH

## 2017-02-23 PROCEDURE — A9270 NON-COVERED ITEM OR SERVICE: HCPCS | Mod: GY | Performed by: NURSE PRACTITIONER

## 2017-02-23 PROCEDURE — 99239 HOSP IP/OBS DSCHRG MGMT >30: CPT | Performed by: NURSE PRACTITIONER

## 2017-02-23 RX ORDER — GABAPENTIN 300 MG/1
300 CAPSULE ORAL 2 TIMES DAILY
Qty: 60 CAPSULE | Refills: 0 | Status: ON HOLD | OUTPATIENT
Start: 2017-02-23 | End: 2017-08-31

## 2017-02-23 RX ORDER — LITHIUM CARBONATE 300 MG/1
300 TABLET, FILM COATED, EXTENDED RELEASE ORAL EVERY 12 HOURS
Qty: 60 TABLET | Refills: 0 | Status: ON HOLD | OUTPATIENT
Start: 2017-02-23 | End: 2017-09-04

## 2017-02-23 RX ADMIN — TRAZODONE HYDROCHLORIDE 50 MG: 50 TABLET ORAL at 22:38

## 2017-02-23 RX ADMIN — UMECLIDINIUM 1 PUFF: 62.5 AEROSOL, POWDER ORAL at 08:06

## 2017-02-23 RX ADMIN — LITHIUM CARBONATE 300 MG: 300 TABLET, FILM COATED, EXTENDED RELEASE ORAL at 08:05

## 2017-02-23 RX ADMIN — ACETAMINOPHEN 650 MG: 325 TABLET, FILM COATED ORAL at 13:08

## 2017-02-23 RX ADMIN — VITAMIN D, TAB 1000IU (100/BT) 1000 UNITS: 25 TAB at 08:05

## 2017-02-23 RX ADMIN — OMEPRAZOLE 40 MG: 20 CAPSULE, DELAYED RELEASE ORAL at 06:30

## 2017-02-23 RX ADMIN — Medication 5 MG: at 19:45

## 2017-02-23 RX ADMIN — GABAPENTIN 300 MG: 300 CAPSULE ORAL at 20:44

## 2017-02-23 RX ADMIN — GABAPENTIN 300 MG: 300 CAPSULE ORAL at 08:05

## 2017-02-23 RX ADMIN — DOCUSATE SODIUM 100 MG: 100 CAPSULE, LIQUID FILLED ORAL at 08:05

## 2017-02-23 RX ADMIN — DOCUSATE SODIUM 100 MG: 100 CAPSULE, LIQUID FILLED ORAL at 20:44

## 2017-02-23 RX ADMIN — NICOTINE 1 PATCH: 14 PATCH, EXTENDED RELEASE TRANSDERMAL at 08:05

## 2017-02-23 RX ADMIN — FLUTICASONE PROPIONATE 2 SPRAY: 50 SPRAY, METERED NASAL at 08:05

## 2017-02-23 RX ADMIN — HYDROXYZINE HYDROCHLORIDE 50 MG: 25 TABLET ORAL at 16:05

## 2017-02-23 RX ADMIN — LITHIUM CARBONATE 300 MG: 300 TABLET, FILM COATED, EXTENDED RELEASE ORAL at 19:45

## 2017-02-23 ASSESSMENT — ACTIVITIES OF DAILY LIVING (ADL)
DRESS: INDEPENDENT;SCRUBS (BEHAVIORAL HEALTH)
GROOMING: INDEPENDENT
GROOMING: INDEPENDENT
DRESS: SCRUBS (BEHAVIORAL HEALTH)
ORAL_HYGIENE: INDEPENDENT
ORAL_HYGIENE: INDEPENDENT

## 2017-02-23 NOTE — DISCHARGE INSTRUCTIONS
Behavioral Discharge Planning and Instructions    Summary: Megan was admitted for hallucinations and inability for self care.    Main Diagnosis: bipolar type I disorder-current episode manic.    Major Treatments, Procedures and Findings: Stabilize with medications, connect with community programs.     Symptoms to Report: feeling more aggressive, increased confusion, losing more sleep, mood getting worse or thoughts of suicide    Lifestyle Adjustment: Take all medications as prescribed, meet with doctor/ medication provider, out patient therapist, , and [facility] worker as scheduled. Abstain from alcohol or any unprescribed drugs.     Psychiatry Follow-up:     [facility]  PCP- Alexander Mayfield - March 10th, @ 1:45 pm.  Medication Management - Keeley Roberts - is out, meds will be managed by Dr. Mayfield until she returns.  [address] [place] [address]  Phone: [phone]   Fax: [phone]    Mobile Crisis Team/ Crisis Hotline: Call 1-489.520.1699 to reach [facility] Crisis Response.  [place] [address] [place], [place]  Phone: (929) [phone]      Resources:   Crisis Intervention: 960.392.1561 or 684-129-8214 (TTY: 260.352.4895).  Call anytime for help.  National Skipwith on Mental Illness (www.[place].chastity.org): 673.930.8284 or 937-243-2128.  Alcoholics Anonymous (www.alcoholics-anonymous.org): Check your phone book for your local chapter.  Suicide Awareness Voices of Education (SAVE) (www.save.org): 496-596-MBOP (2155)  National Suicide Prevention Line (www.mentalhealthmn.org): 055-628-IKYN (8011)  Mental Health Consumer/Survivor Network of [place] (www.mhcsn.net): 692.679.6677 or 729-275-2974  Mental Health Association of [place] (www.mentalhealth.org): 230.831.4306 or 904-408-5755    General Medication Instructions:   See your medication sheet(s) for instructions.   Take all medicines as directed.  Make no changes unless your doctor suggests them.   Go to all your doctor  visits.  Be sure to have all your required lab tests. This way, your medicines can be refilled on time.  Do not use any drugs not prescribed by your doctor.  Avoid alcohol.    [place] Area:  [facility], Crisis stabilization [facility]- [phone]  [facility] Crisis Line: 1-722.965.9346  [facility] Family Peace: 708-0257  [facility]ual [facility]: 752.454.9142 or 1-575.956.8555  [place] [facility]: 1-954.848.1347 Ext: 279       Calls answered Mon-Fri-8:00 am--4:30 pm    [place]:  [facility] Family [facility]: 1-978.758.6398  [facility] first call for help: 1-218.294.3405  [facility] Crisis Center:  (378) 463-2941      [place] Area:  Warm Line: 1-661.406.8358       Calls answered Tuesday--Saturday 4:00 pm--10:00 pm  [facility] Crisis Line - 488.289.3324  [place] [facility] 677-474-7725    [place] Statewide:  [place] Crisis and Referral Services: 1-331.508.6599  National Suicide Prevention Lifeline: 4-024-078-TALK (6219)   - zpb0hgfx- Text  Life  to [address]6558  First Call for Help: 2-1-1  [place] Helpline- 5-546-AVGI-HELP

## 2017-02-23 NOTE — PLAN OF CARE
Problem: Goal Outcome Summary  Goal: Goal Outcome Summary  Up once and returned to bed. Slept well

## 2017-02-23 NOTE — PLAN OF CARE
Face to face end of shift report received from pm nurse. Rounding completed. Patient observed in bed with eyes closed. Respirations regular unlabored..     Saira Aiken  2/23/2017  12:10 AM

## 2017-02-23 NOTE — PLAN OF CARE
"Problem: Goal Outcome Summary  Goal: Goal Outcome Summary  Outcome: No Change  Patient has been up on the unit and was noted to be cleaning in the lounge. Patient verbalized that she feels \"bored\" here. She was encouraged to attend therapeutic group. Patient stated, \"I go to enough groups in [employer]. 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 She stated that she will \"possibly\" attend tomorrow. She denied anxiety, depression, suicidal ideation, and hallucinations. She c/o \"8/10\" back pain. She was given 5 mg of PRN baclofen at 1800 for back spasms. She was given 650 mg of PRN tylenol at 2015. Decrease in pain noted to \"5/10.\"     Problem: Anxiety (Adult)  Goal: Reduction/Resolution  Patient will verbalize decreased anxiety by discharge/transition of care.   Outcome: Improving  Patient denied anxiety.     Problem: Thought Process Alteration (Adult)  Goal: Improved Thought Process  Patient will report no hallucinations by discharge/transition of care.   Patient denied hallucinations.       "

## 2017-02-23 NOTE — PLAN OF CARE
Face to face end of shift report received from Saira JOHNSON RN. Rounding completed. Patient observed in [facility].     Baylee King  2/23/2017  7:23 AM

## 2017-02-23 NOTE — PLAN OF CARE
Problem: Discharge Planning  Goal: Discharge Planning (Adult, OB, Behavioral, Peds)  Outcome: Improving  Pt will be picked up Friday at 9:00 via [employer] and will call prior to arrival.

## 2017-02-23 NOTE — PLAN OF CARE
Problem: Discharge Planning  Goal: Discharge Planning (Adult, OB, Behavioral, Peds)  Outcome: Improving  Met with pt this morning, she asks if she can leave tomorrow and how she's getting home. Talked with pt about either setting up a ride from her insurance or the [employer]. States she is feeling more clear today. Has been out in the lounge socializing with peers and coloring. Denies SI, depression, and hallucinations.

## 2017-02-23 NOTE — DISCHARGE SUMMARY
Psychiatric Discharge Summary    Megan Godinez MRN# [id number]   Age: 51 year old YOB: 1965     Date of Admission:  2/21/2017  Date of Discharge:  2/24/2017  Admitting Physician:  Ricardo Pena MD  Discharge Physician:  Laxmi Urbina NP          Event Leading to Hospitalization and Hospital Stay   Megan Godinez is a 51 year old single  female who presented via [facility] ER after police were called over concerns of her acting strangely with disorganized behavior in the parking lot of a local business. She tells me that she had some money issues and was getting over the holidays but she is better now. She denies any current auditory hallucinations. Megan is disorganized and rambles. She does not respond appropriately to questions when asked.  Megan did sleep while here and did improve in mood and symptoms. She did also start taking lithium which contributed to mood stabilization. She is more lucid and able to participate in a logical, linear and goal directed conversation. She tells me she began to feel the financial stress of the holidays this past month and became overwhelmed. She agrees that the lithium has helped with mood stabilization and control. Megan will follow up with her primary care provider and will consider seeing her outpatient therapist.          At time of discharge, there is no evidence that patient is in immediate danger of self or others.        DIagnoses:     bipolar type I disorder-current episode manic         Labs:   No results found for this or any previous visit (from the past 24 hour(s)).               Discharge Medications:     Current Discharge Medication List      START taking these medications    Details   lithium (ESKALITH/LITHOBID) 300 MG CR tablet Take 1 tablet (300 mg) by mouth every 12 hours  Qty: 60 tablet, Refills: 0    Associated Diagnoses: Bipolar I disorder, most recent episode (or current) manic (H)         CONTINUE  these medications which have CHANGED    Details   gabapentin (NEURONTIN) 300 MG capsule Take 1 capsule (300 mg) by mouth 2 times daily  Qty: 60 capsule, Refills: 0    Associated Diagnoses: Bipolar I disorder, most recent episode (or current) manic (H)         CONTINUE these medications which have NOT CHANGED    Details   BACLOFEN PO Take 5 mg by mouth 3 times daily as needed for muscle spasms      tiotropium (SPIRIVA) 18 MCG capsule Inhale 18 mcg into the lungs daily      albuterol (2.5 MG/3ML) 0.083% neb solution Inhale 1 vial into the lungs every 4 hours as needed for shortness of breath / dyspnea or wheezing (1 puff q4hrs PRN)      OMEPRAZOLE PO Take 40 mg by mouth daily       fluticasone (FLONASE) 50 MCG/ACT nasal spray Spray 2 sprays into both nostrils daily      Alendronate Sodium (FOSAMAX PO) Take 70 mg by mouth once a week Patient takes every Sunday      loratadine-pseudoePHEDrine (CLARITIN-D 24-HOUR)  MG per tablet Take 1 tablet by mouth daily as needed for allergies      docusate sodium (STOOL SOFTENER) 100 MG tablet Take 100 mg by mouth      Cholecalciferol (VITAMIN D3 PO) Take 1,000 Units by mouth daily               Justification for dual anti-psychotic use: Not applicable       Psychiatric Examination:   Appearance:  awake, alert, adequately groomed and dressed in hospital scrubs  Attitude:  cooperative  Eye Contact:  good  Mood:  good  Affect:  appropriate and in normal range  Speech:  clear, coherent  Psychomotor Behavior:  no evidence of tardive dyskinesia, dystonia, or tics  Thought Process:  logical, linear and goal oriented  Associations:  no loose associations  Thought Content:  no evidence of suicidal ideation or homicidal ideation, no evidence of psychotic thought, no auditory hallucinations present and no visual hallucinations present  Insight:  good  Judgment:  intact  Oriented to:  time, person, and place  Attention Span and Concentration:  intact  Recent and Remote Memory:   intact  Fund of Knowledge: appropriate  Muscle Strength and Tone: normal  Gait and Station: Normal  Perception: No perceptual disturbances noted       Discharge Plan:     Psychiatry Follow-up:      [facility]  PCP- Alexander Mayfield - March 10th, @ 1:45 pm.  Medication Management - Keeley Roberts - is out, meds will be managed by Dr. Mayfield until she returns.  [email or web address] [place] [address]  Phone: [phone]   Fax: [phone]     Mobile Crisis Team/ Crisis Hotline: Call 1-423.534.3978 to reach [facility] Crisis Response.  [facility]  [address] [place], [place]  Phone: [phone]  Attestation:  The patient has been seen and evaluated by me,  Laxmi Urbina NP         Discharge Services Provided:    60 minutes spent on discharge services, including:  Final examination of patient.  Review and discussion of Hospital stay.  Instructions for continued outpatient care/goals.  Preparation of discharge records.  Preparation of medications refills and new prescriptions.  Preparation of Applicable referral forms.

## 2017-02-23 NOTE — PLAN OF CARE
Face to face end of shift report received from Baylee HARRY. Rounding completed. Patient observed in room.    Florence Saldivar  2/23/2017  4:11 PM

## 2017-02-23 NOTE — PLAN OF CARE
Problem: Goal Outcome Summary  Goal: Goal Outcome Summary  Outcome: Improving  Pt denies SI HI SIB and depression. Pt denies hallucinations but is still appearing a bit preoccupied. Pt able to track conversation and thinking is clearer. Pt able to make needs known. Pt reports back pain rates 8.5/10 but declines medication at this time. Pt states she has a little anxiety but manageable.  Pt status 15 d/c'd. Pt requested to shave, writer brought shaver to pt for use while under supervision. Pt pleasant and cooperative with assessments and medications.     1308 pt requested PRN tylenol for back pain rated 8/10.

## 2017-02-24 VITALS
HEIGHT: 64 IN | SYSTOLIC BLOOD PRESSURE: 118 MMHG | DIASTOLIC BLOOD PRESSURE: 63 MMHG | BODY MASS INDEX: 21.24 KG/M2 | HEART RATE: 83 BPM | OXYGEN SATURATION: 97 % | TEMPERATURE: 98.2 F | RESPIRATION RATE: 16 BRPM | WEIGHT: 124.4 LBS

## 2017-02-24 PROCEDURE — 25000132 ZZH RX MED GY IP 250 OP 250 PS 637: Mod: GY | Performed by: NURSE PRACTITIONER

## 2017-02-24 PROCEDURE — A9270 NON-COVERED ITEM OR SERVICE: HCPCS | Mod: GY | Performed by: NURSE PRACTITIONER

## 2017-02-24 RX ADMIN — VITAMIN D, TAB 1000IU (100/BT) 1000 UNITS: 25 TAB at 08:10

## 2017-02-24 RX ADMIN — FLUTICASONE PROPIONATE 2 SPRAY: 50 SPRAY, METERED NASAL at 08:10

## 2017-02-24 RX ADMIN — LITHIUM CARBONATE 300 MG: 300 TABLET, FILM COATED, EXTENDED RELEASE ORAL at 08:10

## 2017-02-24 RX ADMIN — NICOTINE 1 PATCH: 14 PATCH, EXTENDED RELEASE TRANSDERMAL at 08:10

## 2017-02-24 RX ADMIN — DOCUSATE SODIUM 100 MG: 100 CAPSULE, LIQUID FILLED ORAL at 08:10

## 2017-02-24 RX ADMIN — GABAPENTIN 300 MG: 300 CAPSULE ORAL at 08:10

## 2017-02-24 RX ADMIN — OMEPRAZOLE 40 MG: 20 CAPSULE, DELAYED RELEASE ORAL at 06:33

## 2017-02-24 RX ADMIN — UMECLIDINIUM 1 PUFF: 62.5 AEROSOL, POWDER ORAL at 08:10

## 2017-02-24 ASSESSMENT — ACTIVITIES OF DAILY LIVING (ADL)
DRESS: SCRUBS (BEHAVIORAL HEALTH);INDEPENDENT
ORAL_HYGIENE: INDEPENDENT
GROOMING: INDEPENDENT

## 2017-02-24 NOTE — PLAN OF CARE
Problem: Goal Outcome Summary  Goal: Goal Outcome Summary  Outcome: Improving  Pt denies SI HI SIB hallucinations anxiety depression and pain. Pt is brighter this shift. Pt happy to be going home. Pt pleasant and cooperative with assessments.

## 2017-02-24 NOTE — PLAN OF CARE
Face to face end of shift report received from Saira JOHNSON RN. Rounding completed. Patient observed in [facility].     Baylee King  2/24/2017  7:32 AM

## 2017-02-24 NOTE — PLAN OF CARE
Problem: Goal Outcome Summary  Goal: Goal Outcome Summary  Outcome: Improving  Patient has been in and out of her room. States she is happy to be discharging tomorrow. Denies any hallucinations. Denies feeling depressed or suicidal. Did admit to anxiety 8/10 and received Atarax 50 mg with good results. Did not want to attend any groups. Offers no physical complaints at this time.  1946 Patient received baclofen for lower back spasms. Will continue to monitor for comfort. Also Merlin from [facility] states he will be at front entrance at 9:00 a.m.tomorrow with [employer].  2045 States Baclofen helped a little.  2238 Patient requested and received trazodone for insomnia.      Problem: Anxiety (Adult)  Goal: Reduction/Resolution  Patient will verbalize decreased anxiety by discharge/transition of care.   Outcome: Improving  Had Atarax which helped with anxiety of 8/10.    Problem: Thought Process Alteration (Adult)  Goal: Improved Thought Process  Patient will report no hallucinations by discharge/transition of care.   Outcome: Improving  Patient denies any hallucinations.

## 2017-02-24 NOTE — PLAN OF CARE
Problem: Discharge Planning  Goal: Discharge Planning (Adult, OB, Behavioral, Peds)  Outcome: Improving  Discharge instructions, including; demographic sheet, psychiatric evaluation, discharge summary, and AVS were faxed to these next level of care providers [facility] [place]

## 2017-02-24 NOTE — PLAN OF CARE
Face to face end of shift report received from pm nurse. Rounding completed. Patient observed in bed with eyes closed. Respirations regular unlabored..     Saira Aiken  2/24/2017  12:07 AM

## 2017-02-24 NOTE — PLAN OF CARE
Problem: Goal Outcome Summary  Goal: Goal Outcome Summary  Up once to the bathroom and for a snack. Slept remainder of night.

## 2017-08-30 ENCOUNTER — TRANSFERRED RECORDS (OUTPATIENT)
Dept: HEALTH INFORMATION MANAGEMENT | Facility: HOSPITAL | Age: 52
End: 2017-08-30

## 2017-08-30 LAB
ALT SERPL-CCNC: 20 IU/L (ref 6–31)
AST SERPL-CCNC: 23 IU/L (ref 10–40)
CREAT SERPL-MCNC: 0.7 MG/DL (ref 0.4–1)
GLUCOSE SERPL-MCNC: 97 MG/DL (ref 70–100)
POTASSIUM SERPL-SCNC: 3.7 MEQ/L (ref 3.4–5.1)
TSH SERPL-ACNC: 2.52 UIU/ML (ref 0.4–3.99)

## 2017-08-31 ENCOUNTER — TRANSFERRED RECORDS (OUTPATIENT)
Dept: HEALTH INFORMATION MANAGEMENT | Facility: HOSPITAL | Age: 52
End: 2017-08-31

## 2017-08-31 ENCOUNTER — HOSPITAL ENCOUNTER (INPATIENT)
Facility: HOSPITAL | Age: 52
LOS: 5 days | Discharge: HOME | DRG: 885 | End: 2017-09-05
Attending: PSYCHIATRY & NEUROLOGY | Admitting: PSYCHIATRY & NEUROLOGY
Payer: MEDICARE

## 2017-08-31 DIAGNOSIS — M54.40 ACUTE BILATERAL LOW BACK PAIN WITH SCIATICA, SCIATICA LATERALITY UNSPECIFIED: ICD-10-CM

## 2017-08-31 DIAGNOSIS — F31.10 BIPOLAR I DISORDER, MOST RECENT EPISODE (OR CURRENT) MANIC (H): Primary | Chronic | ICD-10-CM

## 2017-08-31 PROBLEM — R45.851 DEPRESSION WITH SUICIDAL IDEATION: Status: ACTIVE | Noted: 2017-08-31

## 2017-08-31 PROBLEM — F32.A DEPRESSION WITH SUICIDAL IDEATION: Status: ACTIVE | Noted: 2017-08-31

## 2017-08-31 PROCEDURE — 25000132 ZZH RX MED GY IP 250 OP 250 PS 637: Mod: GY | Performed by: NURSE PRACTITIONER

## 2017-08-31 PROCEDURE — 12400000 ZZH R&B MH

## 2017-08-31 PROCEDURE — A9270 NON-COVERED ITEM OR SERVICE: HCPCS | Mod: GY | Performed by: NURSE PRACTITIONER

## 2017-08-31 PROCEDURE — 99223 1ST HOSP IP/OBS HIGH 75: CPT | Mod: AI | Performed by: NURSE PRACTITIONER

## 2017-08-31 RX ORDER — OLANZAPINE 10 MG/1
10 TABLET ORAL
Status: DISCONTINUED | OUTPATIENT
Start: 2017-08-31 | End: 2017-09-05 | Stop reason: HOSPADM

## 2017-08-31 RX ORDER — BACLOFEN 10 MG/1
5 TABLET ORAL 3 TIMES DAILY PRN
Status: DISCONTINUED | OUTPATIENT
Start: 2017-08-31 | End: 2017-09-03

## 2017-08-31 RX ORDER — OLANZAPINE 10 MG/2ML
10 INJECTION, POWDER, FOR SOLUTION INTRAMUSCULAR
Status: DISCONTINUED | OUTPATIENT
Start: 2017-08-31 | End: 2017-09-05 | Stop reason: HOSPADM

## 2017-08-31 RX ORDER — NICOTINE 21 MG/24HR
1 PATCH, TRANSDERMAL 24 HOURS TRANSDERMAL EVERY 24 HOURS
Status: DISCONTINUED | OUTPATIENT
Start: 2017-08-31 | End: 2017-09-05 | Stop reason: HOSPADM

## 2017-08-31 RX ORDER — ACETAMINOPHEN 325 MG/1
650 TABLET ORAL EVERY 4 HOURS PRN
Status: DISCONTINUED | OUTPATIENT
Start: 2017-08-31 | End: 2017-09-05 | Stop reason: HOSPADM

## 2017-08-31 RX ORDER — SERTRALINE HYDROCHLORIDE 25 MG/1
25 TABLET, FILM COATED ORAL DAILY
Status: DISCONTINUED | OUTPATIENT
Start: 2017-08-31 | End: 2017-09-05 | Stop reason: HOSPADM

## 2017-08-31 RX ORDER — NICOTINE 21 MG/24HR
1 PATCH, TRANSDERMAL 24 HOURS TRANSDERMAL EVERY 24 HOURS
COMMUNITY

## 2017-08-31 RX ORDER — LAMOTRIGINE 100 MG/1
100 TABLET ORAL 2 TIMES DAILY
Status: DISCONTINUED | OUTPATIENT
Start: 2017-08-31 | End: 2017-09-05 | Stop reason: HOSPADM

## 2017-08-31 RX ORDER — DOCUSATE SODIUM 100 MG/1
100 CAPSULE, LIQUID FILLED ORAL 3 TIMES DAILY
Status: DISCONTINUED | OUTPATIENT
Start: 2017-08-31 | End: 2017-09-05 | Stop reason: HOSPADM

## 2017-08-31 RX ORDER — ALENDRONATE SODIUM 70 MG/1
70 TABLET ORAL WEEKLY
Status: DISCONTINUED | OUTPATIENT
Start: 2017-08-31 | End: 2017-08-31 | Stop reason: CLARIF

## 2017-08-31 RX ORDER — ALUMINA, MAGNESIA, AND SIMETHICONE 2400; 2400; 240 MG/30ML; MG/30ML; MG/30ML
30 SUSPENSION ORAL EVERY 4 HOURS PRN
Status: DISCONTINUED | OUTPATIENT
Start: 2017-08-31 | End: 2017-09-05 | Stop reason: HOSPADM

## 2017-08-31 RX ORDER — FLUTICASONE PROPIONATE 50 MCG
2 SPRAY, SUSPENSION (ML) NASAL DAILY
Status: DISCONTINUED | OUTPATIENT
Start: 2017-08-31 | End: 2017-09-05 | Stop reason: HOSPADM

## 2017-08-31 RX ORDER — LITHIUM CARBONATE 300 MG/1
300 TABLET, FILM COATED, EXTENDED RELEASE ORAL EVERY 12 HOURS
Status: DISCONTINUED | OUTPATIENT
Start: 2017-08-31 | End: 2017-08-31

## 2017-08-31 RX ORDER — TRAZODONE HYDROCHLORIDE 50 MG/1
50 TABLET, FILM COATED ORAL
Status: DISCONTINUED | OUTPATIENT
Start: 2017-08-31 | End: 2017-09-05 | Stop reason: HOSPADM

## 2017-08-31 RX ORDER — ALBUTEROL SULFATE 0.83 MG/ML
1 SOLUTION RESPIRATORY (INHALATION) EVERY 4 HOURS PRN
Status: DISCONTINUED | OUTPATIENT
Start: 2017-08-31 | End: 2017-09-05 | Stop reason: HOSPADM

## 2017-08-31 RX ORDER — DOCUSATE SODIUM 100 MG/1
100 CAPSULE, LIQUID FILLED ORAL DAILY
Status: DISCONTINUED | OUTPATIENT
Start: 2017-08-31 | End: 2017-08-31

## 2017-08-31 RX ORDER — GABAPENTIN 300 MG/1
300 CAPSULE ORAL 3 TIMES DAILY
Status: DISCONTINUED | OUTPATIENT
Start: 2017-08-31 | End: 2017-09-05 | Stop reason: HOSPADM

## 2017-08-31 RX ORDER — HYDROXYZINE HYDROCHLORIDE 25 MG/1
25-50 TABLET, FILM COATED ORAL EVERY 4 HOURS PRN
Status: DISCONTINUED | OUTPATIENT
Start: 2017-08-31 | End: 2017-09-05 | Stop reason: HOSPADM

## 2017-08-31 RX ADMIN — ACETAMINOPHEN 650 MG: 325 TABLET, FILM COATED ORAL at 22:51

## 2017-08-31 RX ADMIN — HYDROXYZINE HYDROCHLORIDE 50 MG: 25 TABLET ORAL at 07:48

## 2017-08-31 RX ADMIN — DOCUSATE SODIUM 100 MG: 100 CAPSULE, LIQUID FILLED ORAL at 13:13

## 2017-08-31 RX ADMIN — FLUTICASONE PROPIONATE 2 SPRAY: 50 SPRAY, METERED NASAL at 13:14

## 2017-08-31 RX ADMIN — HYDROXYZINE HYDROCHLORIDE 50 MG: 25 TABLET ORAL at 20:27

## 2017-08-31 RX ADMIN — ACETAMINOPHEN 650 MG: 325 TABLET, FILM COATED ORAL at 10:13

## 2017-08-31 RX ADMIN — SERTRALINE HYDROCHLORIDE 25 MG: 25 TABLET ORAL at 13:14

## 2017-08-31 RX ADMIN — DOCUSATE SODIUM 100 MG: 100 CAPSULE, LIQUID FILLED ORAL at 20:27

## 2017-08-31 RX ADMIN — NICOTINE 1 PATCH: 14 PATCH, EXTENDED RELEASE TRANSDERMAL at 13:16

## 2017-08-31 RX ADMIN — Medication 5 MG: at 13:14

## 2017-08-31 RX ADMIN — GABAPENTIN 300 MG: 300 CAPSULE ORAL at 20:27

## 2017-08-31 RX ADMIN — VITAMIN C 1 TABLET: TAB at 13:14

## 2017-08-31 RX ADMIN — ACETAMINOPHEN 650 MG: 325 TABLET, FILM COATED ORAL at 15:42

## 2017-08-31 RX ADMIN — GABAPENTIN 300 MG: 300 CAPSULE ORAL at 13:13

## 2017-08-31 RX ADMIN — LAMOTRIGINE 100 MG: 100 TABLET ORAL at 20:27

## 2017-08-31 RX ADMIN — VITAMIN D, TAB 1000IU (100/BT) 1000 UNITS: 25 TAB at 13:13

## 2017-08-31 RX ADMIN — TRAZODONE HYDROCHLORIDE 50 MG: 50 TABLET ORAL at 20:27

## 2017-08-31 RX ADMIN — UMECLIDINIUM 1 PUFF: 62.5 AEROSOL, POWDER ORAL at 13:15

## 2017-08-31 RX ADMIN — LAMOTRIGINE 100 MG: 100 TABLET ORAL at 13:27

## 2017-08-31 ASSESSMENT — ACTIVITIES OF DAILY LIVING (ADL)
RETIRED_EATING: 0-->INDEPENDENT
DRESS: INDEPENDENT;SCRUBS (BEHAVIORAL HEALTH)
GROOMING: INDEPENDENT
DRESS: 0-->INDEPENDENT
BATHING: 0-->INDEPENDENT
ORAL_HYGIENE: INDEPENDENT
TRANSFERRING: 0-->INDEPENDENT
AMBULATION: 0-->INDEPENDENT
DRESS: INDEPENDENT;SCRUBS (BEHAVIORAL HEALTH)
COGNITION: 0 - NO COGNITION ISSUES REPORTED
SWALLOWING: 0-->SWALLOWS FOODS/LIQUIDS WITHOUT DIFFICULTY
LAUNDRY: UNABLE TO COMPLETE
TOILETING: 0-->INDEPENDENT
FALL_HISTORY_WITHIN_LAST_SIX_MONTHS: NO
RETIRED_COMMUNICATION: 0-->UNDERSTANDS/COMMUNICATES WITHOUT DIFFICULTY
GROOMING: INDEPENDENT

## 2017-08-31 NOTE — IP AVS SNAPSHOT
MRN:[id number]                      After Visit Summary   8/31/2017    Megan Godinez    MRN: [id number]           Thank you!     Thank you for choosing [facility] for your care. Our goal is always to provide you with excellent care. Hearing back from our patients is one way we can continue to improve our services. Please take a few minutes to complete the written survey that you may receive in the mail after you visit with us. Thank you!        Patient Information     Date Of Birth          1965        Designated Caregiver       Most Recent Value    Caregiver    Will someone help with your care after discharge? no      About your hospital stay     You were admitted on:  August 31, 2017 You last received care in the:  [facility]    You were discharged on:  September 5, 2017       Who to Call     For medical emergencies, please call 911.  For non-urgent questions about your medical care, please call your primary care provider or clinic, [phone]          Attending Provider     Provider Specialty    Ricardo Pena MD Psychiatry    Margo Garcia NP Nurse Practitioner       Primary Care Provider Office Phone # Fax #    Alexander LAILA Mayfield [phone]      Further instructions from your care team       Discharge Note    Patient Discharged to home on 9/5/2017 7:27 AM via No transportation concerns via [employer] accompanied by .     Patient informed of discharge instructions in AVS. patient verbalizes understanding and denies having any questions pertaining to AVS. Patient stable at time of discharge. Patient denies SI, HI, and thoughts of self harm at time of discharge. All personal belongings returned to patient. Discharge prescriptions sent to [place], [place] via electronic communication. Psych evaluation, history and physical, AVS, and discharge summary faxed to next level of care-   will take care of this - pt does have a  "copy and has signed for.  Did administer Lamictal and Neuronton  - as ordered for am and pt requested..     Adelina Melendez  9/5/2017  7:27 AM  Discharge .     { :5977303} discharge instructions in AVS. {patient...:163253} verbalizes understanding and denies having any questions pertaining to AVS. Patient stable at time of discharge. Patient denies SI, HI, and thoughts of self harm at time of discharge. All personal belongings returned to patient. Discharge prescriptions sent to *** via {communication:517387}. Psych evaluation, history and physical, AVS, and discharge summary faxed to next level of care- ***.     Adelina Melendez  9/5/2017  7:27 AM  Discharge Note    Patient { :3742988::\"Discharged to home\"} on 9/5/2017 7:28 AM via {Transportation Mode:692074} accompanied by ***.     { :0154469} discharge instructions in AVS. {patient...:260979} verbalizes understanding and denies having any questions pertaining to AVS. Patient stable at time of discharge. Patient denies SI, HI, and thoughts of self harm at time of discharge. All personal belongings returned to patient. Discharge prescriptions sent to *** via {communication:405253}. Psych evaluation, history and physical, AVS, and discharge summary faxed to next level of care- ***.     Adelina Melendez  9/5/2017  7:28 AM  Behavioral Discharge Planning and Instructions     Summary: Megan was admitted to  with increased depression.     Main Diagnosis: bipolar type I disorder-current episode manic.     Major Treatments, Procedures and Findings: Stabilize with medications, connect with community programs.      Symptoms to Report: feeling more aggressive, increased confusion, losing more sleep, mood getting worse or thoughts of suicide     Lifestyle Adjustment: Take all medications as prescribed, meet with doctor/ medication provider, out patient therapist, , and [facility] worker as scheduled. Abstain from alcohol or any unprescribed drug    IF YOU SHOULD HAVE " ANY QUESTIONS OR CONCERNS PLEASE CALL THE UNIT DIRECTLY AT [phone] AND ASK FOR A NURSE             Psychiatry Follow-up:      [facility]  PCP- Alexander Mayfield - September 13th @ 11:00 (in the [place] office)   [address] [place] [address]  Phone: [phone]   Fax: [phone]    [facility]  [facility] -   Therapy -    [address] [place] [address]  Phone: [phone]  Fax: [phone]     Mobile Crisis Team/ Crisis Hotline: Call 1-810.436.9931 to reach [facility] Crisis Response.  [facility]  [address] [place], [place]  Phone: [phone]) [phone]        Resources:   Crisis Intervention: 558.766.6494 or 568-812-0636 (TTY: 609.338.7482).  Call anytime for help.  National [place] on Mental Illness (www.[place].chastity.org): 277.745.2814 or 905-463-7644.  Alcoholics Anonymous (www.alcoholics-anonymous.org): Check your phone book for your local chapter.  Suicide Awareness Voices of Education (SAVE) (www.save.org): 993-832-VAFU (4830)  National Suicide Prevention Line (www.mentalhealthmn.org): 170-903-XYFE (7030)  Mental Health Consumer/Survivor Network of [place] (www.mhcsn.net): 277.236.4087 or 858-273-4569  Mental Health Association of [place] (www.mentalhealth.org): 716.764.1038 or 260-771-0205     General Medication Instructions:   See your medication sheet(s) for instructions.   Take all medicines as directed.  Make no changes unless your doctor suggests them.   Go to all your doctor visits.  Be sure to have all your required lab tests. This way, your medicines can be refilled on time.  Do not use any drugs not prescribed by your doctor.  Avoid alcohol.     [place] Area:  [facility], [facility] stabilization [facility]- [phone]  [facility] Crisis Line: 1-925.121.6564  Advocates For Family Peace: 512-5327  Sexual Assault Program [facility]: [phone] or 1-333.379.8665  [place] Battered Women's Program: 1-917-071-0850 Ext: 279       Calls answered Mon-Fri-8:00  "am--4:30 pm     [place]:  [facility] for Family Peace: 7-362-930-9159  [facility] first call for help: 8-078-297-7535  [facility]:  (869) 244-5716        [place] Area:  Warm Line: 4-676-487-7775       Calls answered Tuesday--Saturday 4:00 pm--10:00 pm  [facility] Crisis Line - [phone]  [facility] Stabilization 907-870-2406     [place] Statewide:  [place] Crisis and Referral Services: 9-808-892-7060  National Suicide Prevention Lifeline: 7-616-768-TALK (3370)   - mbk8xnrw- Text  Life  to 23260  First Call for Help:   [facility] Helpline- 2-672-GEZP-HELP       Pending Results     No orders found from 2017 to 2017.            Statement of Approval     Ordered          17 1321  I have reviewed and agree with all the recommendations and orders detailed in this document.  EFFECTIVE NOW     Approved and electronically signed by:  Diamond Fierro NP             Admission Information     Date & Time Department Dept. Phone    2017 [facility] [phone]      Your Vitals Were     Blood Pressure Pulse Temperature Respirations Height Weight    139/67 73 98.3  F (36.8  C) (Tympanic) 15 1.6 m (5' 3\") 56.1 kg (123 lb 10.9 oz)    Pulse Oximetry BMI (Body Mass Index)                96% 21.91 kg/m2          noodls Information     noodls lets you send messages to your doctor, view your test results, renew your prescriptions, schedule appointments and more. To sign up, go to [email or web address] . Click on \"Log in\" on the left side of the screen, which will take you to the Welcome page. Then click on \"Sign up Now\" on the right side of the page.     You will be asked to enter the access code listed below, as well as some personal information. Please follow the directions to create your username and password.     Your access code is: [email or web address]  Expires: 2017  6:18 AM     Your access code will  in 90 days. If you need help or a new code, please call " your [facility] clinic or [phone].        Care EveryWhere ID     This is your Care EveryWhere ID. This could be used by other organizations to access your [facility] medical records  [id number]        Equal Access to Services     ALLAN MORENO: Hadii aad ku hadgwengemini Whitney, waaxda luqadaha, qaybta kaalmada adestuyada, katharine wrayjuanito ramachandran chenchoaiyana moreno. So [facility] [phone].    ATENCIÓN: Si habla español, tiene a mendes disposición servicios gratuitos de asistencia lingüística. Llame al [phone].    We comply with applicable federal civil rights laws and [place] laws. We do not discriminate on the basis of race, color, national origin, age, disability sex, sexual orientation or gender identity.               Review of your medicines      CONTINUE these medicines which may have CHANGED, or have new prescriptions. If we are uncertain of the size of tablets/capsules you have at home, strength may be listed as something that might have changed.        Dose / Directions    baclofen 10 MG tablet   Commonly known as:  LIORESAL   This may have changed:  how much to take   Used for:  Acute bilateral low back pain with sciatica, sciatica laterality unspecified        Dose:  10 mg   Take 1 tablet (10 mg) by mouth 3 times daily as needed for muscle spasms   Quantity:  90 tablet   Refills:  0       lamoTRIgine 100 MG tablet   Commonly known as:  LaMICtal   This may have changed:  medication strength   Used for:  Bipolar I disorder, most recent episode (or current) manic (H)        Dose:  100 mg   Take 1 tablet (100 mg) by mouth 2 times daily   Quantity:  60 tablet   Refills:  0       sertraline 25 MG tablet   Commonly known as:  ZOLOFT   This may have changed:  medication strength   Used for:  Bipolar I disorder, most recent episode (or current) manic (H)        Dose:  25 mg   Take 1 tablet (25 mg) by mouth daily   Quantity:  30 tablet   Refills:  0         CONTINUE these medicines which have NOT CHANGED        Dose  / Directions    albuterol (2.5 MG/3ML) 0.083% neb solution        Dose:  1 vial   Inhale 1 vial into the lungs every 4 hours as needed for shortness of breath / dyspnea or wheezing (1 puff q4hrs PRN)   Refills:  0       fluticasone 50 MCG/ACT spray   Commonly known as:  FLONASE        Dose:  2 spray   Spray 2 sprays into both nostrils daily   Refills:  0       FOSAMAX PO        Dose:  70 mg   Take 70 mg by mouth once a week Patient takes every Sunday   Refills:  0       GABAPENTIN PO        Dose:  300 mg   Take 300 mg by mouth 3 times daily   Refills:  0       loratadine-pseudoePHEDrine  MG per 24 hr tablet   Commonly known as:  CLARITIN-D 24-hour        Dose:  1 tablet   Take 1 tablet by mouth daily as needed for allergies   Refills:  0       nicotine 14 MG/24HR 24 hr patch   Commonly known as:  NICODERM CQ        Dose:  1 patch   Place 1 patch onto the skin every 24 hours   Refills:  0       OMEPRAZOLE PO        Dose:  40 mg   Take 40 mg by mouth daily as needed   Refills:  0       SPIRIVA RESPIMAT 2.5 MCG/ACT inhalation aerosol   Generic drug:  tiotropium        Dose:  1 puff   Inhale 1 puff into the lungs daily   Refills:  0       STOOL SOFTENER 100 MG tablet   Generic drug:  docusate sodium        Dose:  100 mg   Take 100 mg by mouth   Refills:  0       vitamin B complex with vitamin C Tabs tablet        Dose:  1 tablet   Take 1 tablet by mouth daily   Refills:  0       VITAMIN D3 PO        Dose:  1000 Units   Take 1,000 Units by mouth daily   Refills:  0         STOP taking     lithium 300 MG CR tablet   Commonly known as:  ESKALITH/LITHOBID                Where to get your medicines      These medications were sent to [facility] - [place], [place] - [address] [facility]  [address] [facility] [address]     Phone:  [phone]     baclofen 10 MG tablet    lamoTRIgine 100 MG tablet    sertraline 25 MG tablet                Protect others around you: Learn how to safely use, store and throw away  your medicines at www.disposemymeds.org.             Medication List: This is a list of all your medications and when to take them. Check marks below indicate your daily home schedule. Keep this list as a reference.      Medications           Morning Afternoon Evening Bedtime As Needed    albuterol (2.5 MG/3ML) 0.083% neb solution   Inhale 1 vial into the lungs every 4 hours as needed for shortness of breath / dyspnea or wheezing (1 puff q4hrs PRN)                                baclofen 10 MG tablet   Commonly known as:  LIORESAL   Take 1 tablet (10 mg) by mouth 3 times daily as needed for muscle spasms   Last time this was given:  10 mg on 9/5/2017  2:16 AM                                fluticasone 50 MCG/ACT spray   Commonly known as:  FLONASE   Spray 2 sprays into both nostrils daily   Last time this was given:  2 sprays on 9/4/2017  8:20 AM                                FOSAMAX PO   Take 70 mg by mouth once a week Patient takes every Sunday                                GABAPENTIN PO   Take 300 mg by mouth 3 times daily   Last time this was given:  300 mg on 9/5/2017  7:09 AM                                lamoTRIgine 100 MG tablet   Commonly known as:  LaMICtal   Take 1 tablet (100 mg) by mouth 2 times daily   Last time this was given:  100 mg on 9/5/2017  7:09 AM                                loratadine-pseudoePHEDrine  MG per 24 hr tablet   Commonly known as:  CLARITIN-D 24-hour   Take 1 tablet by mouth daily as needed for allergies   Last time this was given:  1 tablet on 9/4/2017  9:00 AM                                nicotine 14 MG/24HR 24 hr patch   Commonly known as:  NICODERM CQ   Place 1 patch onto the skin every 24 hours   Last time this was given:  1 patch on 9/4/2017  8:19 AM                                OMEPRAZOLE PO   Take 40 mg by mouth daily as needed   Last time this was given:  40 mg on 9/4/2017 12:29 PM                                sertraline 25 MG tablet   Commonly known as:   ZOLOFT   Take 1 tablet (25 mg) by mouth daily   Last time this was given:  25 mg on 9/4/2017  8:20 AM                                SPIRIVA RESPIMAT 2.5 MCG/ACT inhalation aerosol   Inhale 1 puff into the lungs daily   Generic drug:  tiotropium                                STOOL SOFTENER 100 MG tablet   Take 100 mg by mouth   Generic drug:  docusate sodium                                vitamin B complex with vitamin C Tabs tablet   Take 1 tablet by mouth daily                                VITAMIN D3 PO   Take 1,000 Units by mouth daily   Last time this was given:  1,000 Units on 9/4/2017  8:19 AM

## 2017-08-31 NOTE — PLAN OF CARE
ADMISSION NOTE    Reason for admission depression.  Safety concerns contracts for safety.  Risk for or history of violence denies-none charted..   Full skin assessment: done by dayanna HARRY    Patient arrived on unit from [facility] accompanied by security and transport on 8/31/2017  5:26 AM.   Status on arrival: alert, ambulatory cooperative  see flowsheet for vitals  Patient given tour of unit and Welcome to  unit papers given to patient, wanding completed, belongings inventoried, and admission assessment started.   Patient's legal status on arrival is 72 hour hold. Appropriate legal rights discussed with and copy given to patient. Patient Bill of Rights discussed with and copy given to patient.   Patient denies SI, HI, and thoughts of self harm and contracts for safety while on unit- states is depressed but is here for help and is hopeful.      Theron Evans  8/31/2017  5:26 AM

## 2017-08-31 NOTE — PROGRESS NOTES
08/31/17 0529   Patient Belongings   Did you bring any home meds/supplements to the hospital?  Yes   Disposition of meds  Sent to security/pharmacy per site process   Patient Belongings clothing   Disposition of Belongings galles on face,sports bras,4 shorts,jeans,jacket tanktop,2 top,purse,flip flopsbook,haircolor,bag,makeup,lighter tobaccocharger   Belongings Search Yes   Clothing Search Yes   Second Staff cyndie   General Info Comment n/a    List items sent to safe:zero cash cell phone power, rafi marroquin Medicare birth certificate keysAll other belongings put in assigned cubby in belongings room.     I have reviewed my belongings list on admission and verify that it is correct.     Patient signature_______________________________    Second staff witness (if patient unable to sign) ______________________________       I have received all my belongings at discharge.    Patient signature________________________________    Sisi 8/31/2017  5:34 AM
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
## 2017-08-31 NOTE — PLAN OF CARE
Social Service Psychosocial Assessment  Presenting Problem:   Patient was admitted with increased depression. Pt states she was suppose to start new meds and they weren't regulated. States she has been feeling depressed- has no car and no job. States if she wasn't able to get help she would have thought about suicide but wouldn't have gone through with it, but she was admitted so isn't feeling suicidal.   Marital Status:   Single   Spouse / Children:    No children   Psychiatric TX HX:   Pt was most recently hospitalized here in February of this year and also in August of 2015. History of past hospitalizations.   Suicide Risk Assessment: States if she wasn't able to get help she would have thought about suicide but wouldn't have gone through with it, but she was admitted so isn't feeling suicidal.  Denies any past suicide attempts. Denies SI today.   Access to Lethal Means (explain):   No access to lethal means   Family Psych HX:   None reported   A & Ox:   x3  Medication Adherence:   Unknown   Medical Issues:   See H&P  Visual  Motor Functioning:   Okay   Communication Skills /Needs:   Okay   Ethnicity:   White     Spirituality/Protestant Affiliation:   Forrest    Clergy Request:   No   History:   None reported   Living Situation:   Lives alone in her own apartment in [place] has been there for about 10 years   ADL s:  Independent   Education:  Dropped out in 11th grade, did her CNA course  Financial Situation:   SSDI  Occupation:  Disabled- 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 Misses working and staying busy   Leisure & Recreation:  Walking and being outside   Childhood History:   Reports she was born in [place] and raised in [place], moved to [place] when she was 9 years old due to dad's work - 3 sisters- one lives in [place] [facility], another lives in [place]- isn't close with her sisters. States she hasn't seen her mom since she was 9- says they talk on the  phone every week. Reports a pretty good childhood.  Trauma Abuse HX:   None reported  Relationship / Sexuality:   None reported   Substance Use/ Abuse:  Reports no current substance use. Has previously reported she smokes marijuana and drinks occasionally  Chemical Dependency Treatment HX:   None reported   Legal Issues:   None reported   Significant Life Events:   None reported   Strengths:   Accepting of services, In a safe environment   Challenges /Limitation:   Lacks insight, financial struggles   Patient Support Contact (Include name, relationship, number, and summary of conversation):   Pt has no release signed at this time   Interventions:       Community-Based Programs- [facility]- wants referral     Medical/Dental Care- [facility]- Alexander Mayfield  [phone]    Medication Management- [facility]- Keeley Roberts     Individual Therapy- Wants referral     Insurance Coverage- Medicare, [employer]     Suicide Risk Assessment- States if she wasn't able to get help she would have thought about suicide but wouldn't have gone through with it, but she was admitted so isn't feeling suicidal.  Denies any past suicide attempts. Denies SI today.     High Risk Safety Plan- Talk to supports; Call crisis lines; Go to local ER if feeling suicidal.

## 2017-08-31 NOTE — IP AVS SNAPSHOT
[facility]    [address] [place] [address]    Phone:  [phone]    Fax:  [phone]                                       After Visit Summary   8/31/2017    Megan Godinez    MRN: [id number]           After Visit Summary Signature Page     I have received my discharge instructions, and my questions have been answered. I have discussed any challenges I see with this plan with the nurse or doctor.    ..........................................................................................................................................  Patient/Patient Representative Signature      ..........................................................................................................................................  Patient Representative Print Name and Relationship to Patient    ..................................................               ................................................  Date                                            Time    ..........................................................................................................................................  Reviewed by Signature/Title    ...................................................              ..............................................  Date                                                            Time

## 2017-08-31 NOTE — PLAN OF CARE
Face to face end of shift report received from PIERO Crump. Rounding completed. Patient observed in [facility].     Isabel Severino  8/31/2017  8:03 AM

## 2017-08-31 NOTE — PLAN OF CARE
Problem: General Plan of Care (Inpatient Behavioral)  Goal: Team Discussion  Team Plan:   BEHAVIORAL TEAM DISCUSSION     Participants:  Margo Garcia NP, Laxmi Urbina NP, Coco MCCLAINW, Fartun Marques Discharge Plannner, Shannon Stern RN, Catalino Mckenzie RN, Jessica Leigh OT, Gale Wall OT   Progress: new admit  Continued Stay Criteria/Rationale: new admit, NP to assess  Medical/Physical: None known  Precautions:   Behavioral Orders   Procedures     Code 1 - Restrict to Unit     Routine Programming       As clinically indicated     Self Injury Precaution       Bathroom door to remain locked until after provider evaluation     Status 15       Every 15 minutes.     Plan: NP to assess and determine  Rationale for change in precautions or plan: None

## 2017-08-31 NOTE — H&P
"Psychiatric Eval/H&P  Patient Name: Megan Godinez   YOB: 1965  Age: 52 year old  [id number]    Primary Physician: Alexander Mayfield   Completed By: Laxmi Urbina NP     CC: \"Need to start my new bipolar med\"    HPI   Megan Godinez is a 52 year old single  female who presented via [facility] for the second time in two days with ongoing depression. Megan reports she has increased depression and feeling hopeless and helpless as of late. She was ordered a new medication for bipolar but could not recall the medication. She was unable to get this filled until Friday. She feels she could not wait for it and was not sure about how she could stay safe. Syl reports that she is also more depressed due to losing her car and a job recently so she has had a lot more down time. She tells me she is used to keeping busy so not doing anything is very difficult for her.      SPECIFIC SYMPTOM HISTORY  Sleep:depression .  Recent appetite change: No.  Recent weight change: No.  Special diet: No.  Other nutritional concerns: no.  Psychotic symptoms (subjective): No hallucinations..nonedenies symptoms of psychosis     PMFSPH     Past Psychiatric History: Has been hospitalized approximately four times in the past 9 years. The past three times were for seemingly manic behaviors which cleared rather quickly. This most recent hospitalization is due to a depressive type episode. She has never been committed. She has never attempted suicide. Denies any history of trauma, abuse, or seizure.      Social History:  graduate. Moved to [place] from [place] when she was 9. She has not seen her mother since then. Syl has never been  and has no children. She has three sister with whom she has little communication. She reports she would like to work full time but has had difficulty finding a job.   Education, school, occupation, social/peer relations, hobbies/recreational interests,  history, " "legal history,      Chemical Use History: Uses marijuana but denies any other substance use. Review of records indicates that there could be some suspicious history of alcohol abuse.     Family Psychiatric History: denies any family psychiatric history.        Medical History and ROS  No current outpatient prescriptions on file.     Allergies   Allergen Reactions     Bee Venom Swelling     Severe and went to hospital per pt.     Gluten Meal Other (See Comments)     Metal [Staples] Hives     Pt reported reaction to \"cheap metals.\"      No past medical history on file.  No past surgical history on file.      Physical Exam    Constitutional: oriented to person, place, and time.  appears well-developed and well-nourished.   HENT:   Head: Normocephalic and atraumatic.   Right Ear: External ear normal.   Left Ear: External ear normal.   Nose: Nose normal.   Mouth/Throat: Oropharynx is clear and moist. No oropharyngeal exudate.   Eyes: Conjunctivae and EOM are normal. Pupils are equal, round, and reactive to light. Right eye exhibits no discharge. Left eye exhibits no discharge. No scleral icterus.   Neck: Normal range of motion. Neck supple. No JVD present. No tracheal deviation present. No thyromegaly present.   Cardiovascular: Normal rate, regular rhythm, normal heart sounds and intact distal pulses. Exam reveals no gallop and no friction rub.   No murmur heard.  Pulmonary/Chest: Effort normal and breath sounds normal. No stridor. No respiratory distress.  no wheezes. no rales. no tenderness.   Abdominal: Soft. Bowel sounds are normal.  no distension and no mass. There is no tenderness. There is no rebound and no guarding.  Skin: Dry, intact, no open areas, rashes, moles of concern    Review of Systems:  Constitution: No weight loss, fever, night sweats  Skin: No rashes, pruritus or open wounds  Neuro: No headaches or seizure activity.  Psych:  See HPI  Eyes: No vision changes.  ENT: No problems chewing or swallowing. " "  Musculoskeletal: No muscle pain, joint pain or swelling   Respiratory: No cough or dyspnea  Cardiovascular:  No chest pain,  palpitations or fainting  Gastrointestinal:  No abdominal pain, nausea, vomiting or change in bowel habits         MSE/PSYCH  PSYCHIATRIC EXAM  /97  Pulse 65  Temp 97.1  F (36.2  C) (Tympanic)  Resp 14  Ht 1.6 m (5' 3\")  Wt 54.7 kg (120 lb 8 oz)  SpO2 97%  BMI 21.35 kg/m2  -Appearance/Behavior:   No apparent distress  {attitude:pleasant  -Motor: normal or unremarkable.  -Gait: Normal.    -Abnormal involuntary movements: none.  -Mood: depressed.  -Affect: Tearful.  -Speech: Normal .                 -Thought process/associations: Logical, Linear and Goal directed.  -Thought content: normal .  -Perceptual disturbances: No hallucinations..              -Suicidal/Homicidal Ideation: denies  -Judgment: Good.  -Insight: Adequate.  *Orientation: time, place and person.  *Memory: intact.  *Attention: Good  *Language: fluent, no aphasias, able to repeat phrases and name objects. Vocab intact.  *Fund of information: appropriate for education.  *Cognitive functioning estimate: 0 - independent.     Labs: No results found for this or any previous visit (from the past 48 hour(s)).  No abnormal labs     Assessment/Impression: This is a 52 year old yo female with a history of bipolar disorder type I. She presents with depression at this time. She denies any suicidal ideation or plan but does feel that if she did not get help she would have decompensated further and is unsure what she would have done at that point. Will restart her medications per home. Will also order a cognitive evaluation to assess the need fro help with medications as she is unable to recall the names of her medications. She is able to tell me what she takes medications for however and when she takes them. Syl is interested in support services and therapy.   Educated regarding medication indications, risks, benefits, side " effects, contraindications and possible interactions. Verbally expressed understanding.     DX: Bipolar type I- current episode depressed     Plan:  Admit to Unit: [address]  Attending: JOSELITO Leigh  Patient is: Voluntary  Monitor for target symptoms: decreased depression  Provide a safe environment and therapeutic milieu.   Re-Start/Start: medications per home    Anticipated length of stay: 3-5 days       JOSELITO Leigh

## 2017-08-31 NOTE — PLAN OF CARE
"Problem: General Plan of Care (Inpatient Behavioral)  Goal: Individualization/Patient Specific Goal (IP Behavioral)  The patient and/or their representative will achieve their patient-specific goals related to the plan of care.    The patient-specific goals include: will not have depression upon d/c and will not have S I while admitted and upon d/c and pt will be able to work with provider for a satisfactory med adjustment . Pt will also get help with job situation from s s while here.   Pt is calm and cooperative with nursing assessment. Pt denies SI, HI, and hallucinations. Says she is here to get back on antidepressant medication. States, \"I am not going to hurt myself, I brought myself in, I just need my meds.\" Pt has been up on the unit walking the halls but is withdrawn. Pt says she is feeling \"dazed\" due to lack of sleep as she did get here at 5 in the morning. Pt endorses in anxiety and was given PRN atarax 50 mg po at 0748. Pt came to nurses station teary eye complaining of pain in feet, toe, and back rating 10/10. PRN tylenol 650 mg po was given at 1013. And provider was notified of pain. Will continue to monitor.     1330: Pt got upset and irritable when brought medications. Pt is fixated on the color and shape of each med. States, \"I don't no the names of the med but I no what it looks like.\" She doesn't think they are the right medication because the pills don't look like the ones she has at home. Assured pt that there are different manufactures and the same medication can look different in appearance. Pt then gets upset that she does not take lithium but takes something that ends with \"e.\" Informed pt that lithium is the new medication her doctor had ordered for her. Pt states, \"I told them I am not taking lithium it messes me up more.\" Pt refused the lithium. Provider was notified.   "

## 2017-08-31 NOTE — PHARMACY
"The following home medications were NOT continued on inpatient admission per \"Discontinuation of nonessential home medications during hospitalization\" policy: alendronate 70mg    If a therapeutic holiday is deemed inappropriate per the prescriber, please notify the pharmacist regarding the medication order.    The pharmacist is available to answer any questions and/or concerns the patient may have regarding discontinuation of non-essential medications.    Please ensure that these medications are restarted as needed upon discharge via the medication reconciliation discharge process and included on the discharge medication reconciliation report.    Thank you,  Elias Duvall  "

## 2017-08-31 NOTE — PLAN OF CARE
Face to face end of shift report received from PIERO Hall. Rounding completed. Patient observed.     Adrienne Kay  8/31/2017  4:56 PM

## 2017-09-01 LAB
FOLATE SERPL-MCNC: 7.4 NG/ML
VIT B12 SERPL-MCNC: 653 PG/ML (ref 193–986)

## 2017-09-01 PROCEDURE — A9270 NON-COVERED ITEM OR SERVICE: HCPCS | Mod: GY | Performed by: NURSE PRACTITIONER

## 2017-09-01 PROCEDURE — 82746 ASSAY OF FOLIC ACID SERUM: CPT | Performed by: NURSE PRACTITIONER

## 2017-09-01 PROCEDURE — 97165 OT EVAL LOW COMPLEX 30 MIN: CPT | Mod: GO

## 2017-09-01 PROCEDURE — 25000132 ZZH RX MED GY IP 250 OP 250 PS 637: Mod: GY | Performed by: NURSE PRACTITIONER

## 2017-09-01 PROCEDURE — 12400000 ZZH R&B MH

## 2017-09-01 PROCEDURE — 36415 COLL VENOUS BLD VENIPUNCTURE: CPT | Performed by: NURSE PRACTITIONER

## 2017-09-01 PROCEDURE — 82306 VITAMIN D 25 HYDROXY: CPT | Performed by: NURSE PRACTITIONER

## 2017-09-01 PROCEDURE — 99232 SBSQ HOSP IP/OBS MODERATE 35: CPT | Performed by: NURSE PRACTITIONER

## 2017-09-01 PROCEDURE — 40000133 ZZH STATISTIC OT WARD VISIT

## 2017-09-01 PROCEDURE — 82607 VITAMIN B-12: CPT | Performed by: NURSE PRACTITIONER

## 2017-09-01 RX ADMIN — UMECLIDINIUM 1 PUFF: 62.5 AEROSOL, POWDER ORAL at 08:07

## 2017-09-01 RX ADMIN — HYDROXYZINE HYDROCHLORIDE 50 MG: 25 TABLET ORAL at 18:09

## 2017-09-01 RX ADMIN — VITAMIN C 1 TABLET: TAB at 08:07

## 2017-09-01 RX ADMIN — OMEPRAZOLE 40 MG: 20 CAPSULE, DELAYED RELEASE ORAL at 08:11

## 2017-09-01 RX ADMIN — ACETAMINOPHEN 650 MG: 325 TABLET, FILM COATED ORAL at 19:06

## 2017-09-01 RX ADMIN — LAMOTRIGINE 100 MG: 100 TABLET ORAL at 08:07

## 2017-09-01 RX ADMIN — Medication 5 MG: at 09:09

## 2017-09-01 RX ADMIN — GABAPENTIN 300 MG: 300 CAPSULE ORAL at 08:07

## 2017-09-01 RX ADMIN — GABAPENTIN 300 MG: 300 CAPSULE ORAL at 20:40

## 2017-09-01 RX ADMIN — Medication 5 MG: at 20:40

## 2017-09-01 RX ADMIN — FLUTICASONE PROPIONATE 2 SPRAY: 50 SPRAY, METERED NASAL at 08:07

## 2017-09-01 RX ADMIN — DOCUSATE SODIUM 100 MG: 100 CAPSULE, LIQUID FILLED ORAL at 20:40

## 2017-09-01 RX ADMIN — DOCUSATE SODIUM 100 MG: 100 CAPSULE, LIQUID FILLED ORAL at 08:07

## 2017-09-01 RX ADMIN — ACETAMINOPHEN 650 MG: 325 TABLET, FILM COATED ORAL at 13:08

## 2017-09-01 RX ADMIN — TRAZODONE HYDROCHLORIDE 50 MG: 50 TABLET ORAL at 22:58

## 2017-09-01 RX ADMIN — TRAZODONE HYDROCHLORIDE 50 MG: 50 TABLET ORAL at 20:40

## 2017-09-01 RX ADMIN — DOCUSATE SODIUM 100 MG: 100 CAPSULE, LIQUID FILLED ORAL at 13:05

## 2017-09-01 RX ADMIN — ACETAMINOPHEN 650 MG: 325 TABLET, FILM COATED ORAL at 06:21

## 2017-09-01 RX ADMIN — GABAPENTIN 300 MG: 300 CAPSULE ORAL at 13:05

## 2017-09-01 RX ADMIN — LAMOTRIGINE 100 MG: 100 TABLET ORAL at 20:40

## 2017-09-01 RX ADMIN — NICOTINE 1 PATCH: 14 PATCH, EXTENDED RELEASE TRANSDERMAL at 08:07

## 2017-09-01 RX ADMIN — LORATADINE AND PSEUDOEPHEDRINE 1 TABLET: 10; 240 TABLET, EXTENDED RELEASE ORAL at 13:08

## 2017-09-01 RX ADMIN — VITAMIN D, TAB 1000IU (100/BT) 1000 UNITS: 25 TAB at 08:07

## 2017-09-01 RX ADMIN — SERTRALINE HYDROCHLORIDE 25 MG: 25 TABLET ORAL at 08:07

## 2017-09-01 ASSESSMENT — ACTIVITIES OF DAILY LIVING (ADL)
LAUNDRY: UNABLE TO COMPLETE
GROOMING: INDEPENDENT
ORAL_HYGIENE: INDEPENDENT
PREVIOUS_RESPONSIBILITIES: MEAL PREP;HOUSEKEEPING;LAUNDRY;SHOPPING;MEDICATION MANAGEMENT;FINANCES;DRIVING;WORK
DRESS: INDEPENDENT
ORAL_HYGIENE: INDEPENDENT
GROOMING: INDEPENDENT
DRESS: INDEPENDENT;SCRUBS (BEHAVIORAL HEALTH)
LAUNDRY: UNABLE TO COMPLETE

## 2017-09-01 NOTE — PLAN OF CARE
Face to face end of shift report received from PIERO Steiner. Rounding completed. Patient observed in room.     Isabel Severino  9/1/2017  7:47 AM

## 2017-09-01 NOTE — PLAN OF CARE
Problem: General Plan of Care (Inpatient Behavioral)  Goal: Team Discussion  Team Plan:   BEHAVIORAL TEAM DISCUSSION     Participants: Estefani Hernandez RN, Jessica Leigh OT, Gale Wall OT, Shannon Stenr Supervisor, Diamond Fierro NP, Laxmi Urbina NP, Margo Garcia NP, Fartun Marques DCP   Progress: minimal does not attend groups   Continued Stay Criteria/Rationale: OT eval ordered, med adjustments   Medical/Physical: hepatitis   Precautions:   Behavioral Orders   Procedures     Code 1 - Restrict to Unit     Routine Programming       As clinically indicated     Status 15       Every 15 minutes.     Plan: Continue to stabilize with medication and work on discharge planning.  Rationale for change in precautions or plan: none

## 2017-09-01 NOTE — PLAN OF CARE
Problem: Discharge Planning  Goal: Discharge Planning (Adult, OB, Behavioral, Peds)  Outcome: Improving  Met with Gale from OT, she states that patient completed an OT assessment. Patient has requested outpatient services including therapy, med management, [facility] services.

## 2017-09-01 NOTE — PROGRESS NOTES
"   09/01/17 1100   Quick Adds   Type of Visit Initial Occupational Therapy Evaluation   Living Environment   Lives With alone   Living Arrangements apartment   Home Accessibility no concerns   Transportation Available taxi;family or friend will provide;other (see comments)  ([employer] provides rides to appointments)   Living Environment Comment Pt lives alone in an apartment in [place].  Pt care has broke down and does not have the money to fix it at this time.  States that she does not leave the house as much as she would like to because of her lack of transporation   Functional Level Prior   Ambulation 0-->independent   Transferring 0-->independent   Toileting 0-->independent   Bathing 0-->independent   Dressing 0-->independent   Eating 0-->independent   Communication 0-->understands/communicates without difficulty   Swallowing 0-->swallows foods/liquids without difficulty   Cognition 1 - attention or memory deficits   Prior Functional Level Comment Pt is independent with all ADLS.    General Information   Onset of Illness/Injury or Date of Surgery - Date 08/31/17   Referring Physician Laxmi Urbina   Patient/Family Goals Statement To go back home and get outpatient services and get a job   Additional Occupational Profile Info/Pertinent History of Current Problem Pt is a 52 year old female presenting to OT for cognitive and overall functional assessment.  Pt was admitted after experiencing an increase in depresssion symptoms.  States that she feels this may be due to recently loosing her car and job.  States she is used to staying busy and does not like all the down time.  Pt has a hisotry of bipolar and some past hosptial admits.  Pt reports that she did not graduate from high school , states she had a hard time in school and feels she may have dyslexia.  Pt states that she has a good support system and her coping skills \"are just to avoid the stressful situation\".  Pt states that she is interested in " "outpatient therapay services or someone that can help manage her meds and help get her a job.  She does state that she wants outpatient and does not want anyone coming to her home.    General Observations Pt is alert and a it dismissive.     Cognitive Status Examination   Orientation orientation to person, place and time   Level of Consciousness alert   Able to Follow Commands success, 1-step commands   Personal Safety (Cognitive) WNL/WFL   Memory intact   Attention Reports problems attending;Distractible during evaluation;Quiet environment required   Organization/Problem Solving Reports problems with organization   Executive Function No deficits were identified   Cognitive Comment Completed the MOCA and scored 24/30 with 26 or lower indicating below normal cognition.  Pt appears to be more successful when she can read the directions herself rather than have then read to her.  States she feels she may be dyslexic.Reports problems with concentrating.     Sensory Examination   Sensory Comments Reports pain 5/10 in foot from when she broke her toe.    Range of Motion (ROM)   ROM Comment Strength and ROM grossly WFL   Instrumental Activities of Daily Living (IADL)   Previous Responsibilities meal prep;housekeeping;laundry;shopping;medication management;finances;driving;work   IADL Comments Pt currently reporting depression due to losing her job and vehicle.  Does not like having \"down time\"    Activities of Daily Living Analysis   Impairments Contributing to Impaired Activities of Daily Living cognition impaired  (concentration)   ADL Comments independent with all ADLs   General Therapy Interventions   Planned Therapy Interventions cognition;other (see comments)  (coping skills. )   Clinical Impression   Criteria for Skilled Therapeutic Interventions Met yes, treatment indicated   OT Diagnosis cognitive disruption   Influenced by the following impairments coping skills, negative thinking, concentration   Assessment of " Occupational Performance 1-3 Performance Deficits   Identified Performance Deficits medications   Clinical Decision Making (Complexity) Low complexity   Therapy Frequency 3 times/wk   Predicted Duration of Therapy Intervention (days/wks) 4 weeks   Anticipated Discharge Disposition Home with Outpatient Therapy   Risks and Benefits of Treatment have been explained. Yes   Patient, Family & other staff in agreement with plan of care Yes   Clinical Impression Comments pt is appropriate for OT services to assess cognition, particularly medication management.  Pt may also benefit from exploring healthy coping skills in order to better manange mental health symptoms.  At this time, recommend pt return to her apartment and receive outpatient services for med management and therapy and possibly [facility] worker.    Total Evaluation Time   Total Evaluation Time (Minutes) 20

## 2017-09-01 NOTE — PLAN OF CARE
Problem: General Plan of Care (Inpatient Behavioral)  Goal: Individualization/Patient Specific Goal (IP Behavioral)  The patient and/or their representative will achieve their patient-specific goals related to the plan of care.    The patient-specific goals include: will not have depression upon d/c and will not have S I while admitted and upon d/c and pt will be able to work with provider for a satisfactory med adjustment . Pt will also get help with job situation from s s while here.   Outcome: Improving  Pt has been in bed with eyes closed and regular respirations observed all night. Patient requested band aids for blister on bottom of R foot, applied triple antibiotic cream. Patient woke up at 0600 requesting Tylenol for toe pain and headache. She rated it 6/10 and received Tylenol 650 mg at 0621. Will continue to monitor.

## 2017-09-01 NOTE — PROGRESS NOTES
[facility]  Psychiatric Progress Note      Impression:   This is a 52 year old female with a history of bipolar disorder type I. She is more pleasant today. She tells me that she is feeling better and that she does not want to stay beyond her 72 hour hold. Syl denies any suicidal ideations. Syl is telling me that she does not take lithium as it has agitated her in the past. She does agree to try topamax. Will start this medication today to see if it helps in conjunction with the lamictal she is taking. Syl does think she needs to find employment and this would help her more. She is quite dismissive of conversation today.       Educated regarding medication indications, risks, benefits, side effects, contraindications and possible interactions. Verbally expressed understanding.        DIagnoses:     Bipolar type I- current episode depressed    Attestation:  Patient has been seen and evaluated by me,  Laxmi Urbina, RUTHIE          Interim History:   The patient's care was discussed with the treatment team and chart notes were reviewed.          Medications:     Current Facility-Administered Medications Ordered in Epic   Medication Dose Route Frequency Last Rate Last Dose     hydrOXYzine (ATARAX) tablet 25-50 mg  25-50 mg Oral Q4H PRN   50 mg at 08/31/17 2027     acetaminophen (TYLENOL) tablet 650 mg  650 mg Oral Q4H PRN   650 mg at 09/01/17 0621     alum & mag hydroxide-simethicone (MYLANTA ES/MAALOX  ES) suspension 30 mL  30 mL Oral Q4H PRN         magnesium hydroxide (MILK OF MAGNESIA) suspension 30 mL  30 mL Oral At Bedtime PRN         traZODone (DESYREL) tablet 50 mg  50 mg Oral At Bedtime PRN   50 mg at 08/31/17 2027     OLANZapine (zyPREXA) tablet 10 mg  10 mg Oral Q2H PRN        Or     OLANZapine (zyPREXA) injection 10 mg  10 mg Intramuscular Q2H PRN         albuterol neb solution 2.5 mg  1 vial Nebulization Q4H PRN         baclofen (LIORESAL) half-tab 5 mg  5 mg Oral TID PRN   5 mg at  "09/01/17 0909     cholecalciferol (vitamin D) tablet 1,000 Units  1,000 Units Oral Daily   1,000 Units at 09/01/17 0807     fluticasone (FLONASE) 50 MCG/ACT spray 2 spray  2 spray Both Nostrils Daily   2 spray at 09/01/17 0807     gabapentin (NEURONTIN) capsule 300 mg  300 mg Oral TID   300 mg at 09/01/17 0807     lamoTRIgine (LaMICtal) tablet 100 mg  100 mg Oral BID   100 mg at 09/01/17 0807     loratadine-pseudoePHEDrine (CLARITIN-D 24-hour)  MG per 24 hr tablet 1 tablet  1 tablet Oral Daily PRN         nicotine (NICODERM CQ) 14 MG/24HR 24 hr patch 1 patch  1 patch Transdermal Q24H   1 patch at 09/01/17 0807     omeprazole (priLOSEC) CR capsule 40 mg  40 mg Oral Daily PRN   40 mg at 09/01/17 0811     sertraline (ZOLOFT) tablet 25 mg  25 mg Oral Daily   25 mg at 09/01/17 0807     umeclidinium (INCRUSE ELLIPTA) 62.5 MCG/INH oral inhaler 1 puff  1 puff Inhalation Daily   1 puff at 09/01/17 0807     vitamin b complex w/vitamin C tablet 1 tablet  1 tablet Oral Daily   1 tablet at 09/01/17 0807     nicotine patch REMOVAL   Transdermal Daily         nicotine Patch in Place   Transdermal Q8H         docusate sodium (COLACE) capsule 100 mg  100 mg Oral TID   100 mg at 09/01/17 0807     No current Epic-ordered outpatient prescriptions on file.              10 point ROS negative        Allergies:     Allergies   Allergen Reactions     Bee Venom Swelling     Severe and went to hospital per pt.     Gluten Meal Other (See Comments)     Metal [Staples] Hives     Pt reported reaction to \"cheap metals.\"             Psychiatric Examination:   /85  Pulse 70  Temp 96.4  F (35.8  C) (Tympanic)  Resp 18  Ht 1.6 m (5' 3\")  Wt 54.7 kg (120 lb 8 oz)  SpO2 95%  BMI 21.35 kg/m2  Weight is 120 lbs 8 oz  Body mass index is 21.35 kg/(m^2).    Appearance:  awake, alert and adequately groomed  Attitude:  cooperative  Eye Contact:  good  Mood:  good  Affect:  appropriate and in normal range and mood congruent  Speech:  clear, " coherent  Psychomotor Behavior:  no evidence of tardive dyskinesia, dystonia, or tics and intact station, gait and muscle tone  Thought Process:  logical, linear and goal oriented  Associations:  no loose associations  Thought Content:  no evidence of suicidal ideation or homicidal ideation, no evidence of psychotic thought, no auditory hallucinations present and no visual hallucinations present  Insight:  fair  Judgment:  intact  Oriented to:  time, person, and place  Attention Span and Concentration:  intact  Recent and Remote Memory:  intact  Fund of Knowledge: appropriate  Muscle Strength and Tone: normal  Gait and Station: Normal           Labs:   No results found for this or any previous visit (from the past 48 hour(s)).         Plan:   Start topamax   Discontinue lithium

## 2017-09-01 NOTE — PLAN OF CARE
"Problem: General Plan of Care (Inpatient Behavioral)  Goal: Individualization/Patient Specific Goal (IP Behavioral)  The patient and/or their representative will achieve their patient-specific goals related to the plan of care.    The patient-specific goals include: will not have depression upon d/c and will not have S I while admitted and upon d/c and pt will be able to work with provider for a satisfactory med adjustment . Pt will also get help with job situation from s s while here.   Pt has isolated to room much of this shift. She complains of 10/10 burning pain in bilateral feet and arms. PRN Baclofen 5 mg po was given at 0909 as requested. Denies SI, HI, and hallucinations. Pleasant and cooperative with nursing assessment. Says she hasn't been sleeping at night. Pt states, \"I can't fall asleep.\" Endorses in \"a little\" anxiety and depression \"isn't bad.\" Pt is compliant with prescribed medication. Encouraged pt to go to group. She states, \"when I get some sleep I will.\" Will continue to monitor.       "

## 2017-09-01 NOTE — PLAN OF CARE
Problem: General Plan of Care (Inpatient Behavioral)  Goal: Individualization/Patient Specific Goal (IP Behavioral)  The patient and/or their representative will achieve their patient-specific goals related to the plan of care.    The patient-specific goals include: will not have depression upon d/c and will not have S I while admitted and upon d/c and pt will be able to work with provider for a satisfactory med adjustment . Pt will also get help with job situation from s s while here.   Pt up on unit this evening, noted to be restless at times. Denied feeling suicidal, denied depression or hallucinations, but reports anxiety and requested PRN for this. Pt shared that she needs medication changes and to be set up with resources for better housing before she is discharged. Is quite hopeful in conversation. Did not want to attend groups today but plans to go tomorrow. Physical complaints of back and neck pain rated 8-9, PRN Tylenol requested and given with adequate results in decreasing pain. Did take scheduled medications.

## 2017-09-01 NOTE — PLAN OF CARE
"Problem: General Plan of Care (Inpatient Behavioral)  Goal: Individualization/Patient Specific Goal (IP Behavioral)  The patient and/or their representative will achieve their patient-specific goals related to the plan of care.    The patient-specific goals include: will not have depression upon d/c and will not have S I while admitted and upon d/c and pt will be able to work with provider for a satisfactory med adjustment . Pt will also get help with job situation from s s while here.   Outcome: Improving  Pt denies depression today. Pt is isolative to room and withdrawn from others when she does come out of her room. Pt states she does not like to talk group and does not find this type of therapy therapeutic. Pt states she does not sleep well here as she likes it quiet when she sleeps and it is loud here at night with the doors and others talking. Pt states that she has plans for herself after discharge and she plans to see a therapist by herself where she will then open up. Pt states \"I am home all day, I don't have a job and I have worked all my life, so I am bored.\" She states she does have friends but does not see them often as everyone has their own life. She does talk about her family but reports they are not very supportive. She lays in bed with arms over her eyes while talking with nurse. Pt does report anxiety 6-7/10 due to the constant noise here. Pt was offered a PRN as she is also isolative and withdrawn. Pt did accept and 50 mg Atarax was administered at 1745. Pt states she is tired she has felt this way for a long time. She then states when she gets rested she will attend more groups.     Pt received Trazadone PRN for sleep at 2020.     2200 Pt up irritable and c/o pain in feet and arms. Pt states that she has new areas of breakdown on her feet. She asks for burn cream and bandaids for her feet. Nurse did apply triple antibiotic ointment and bandaid to feet. Pt requests an ice pack.  Pt given a " second dose of Trazodone PRN at 2300,  Nurse did supply pt with an ice pace

## 2017-09-01 NOTE — PLAN OF CARE
Face to face end of shift report received from Isabel LEVIN RN. Rounding completed. Patient observed in group room.     Violetta Conklin  9/1/2017  4:00 PM

## 2017-09-01 NOTE — PLAN OF CARE
Face to face end of shift report received from Adrienne NGUYỄN RN. Rounding completed. Patient observed in bed, appears asleep, respirations observed.     Obdulia Valente  8/31/2017  11:44 PM

## 2017-09-02 PROCEDURE — 25000132 ZZH RX MED GY IP 250 OP 250 PS 637: Mod: GY | Performed by: NURSE PRACTITIONER

## 2017-09-02 PROCEDURE — 99232 SBSQ HOSP IP/OBS MODERATE 35: CPT | Performed by: NURSE PRACTITIONER

## 2017-09-02 PROCEDURE — A9270 NON-COVERED ITEM OR SERVICE: HCPCS | Mod: GY | Performed by: NURSE PRACTITIONER

## 2017-09-02 PROCEDURE — 12400000 ZZH R&B MH

## 2017-09-02 RX ADMIN — DOCUSATE SODIUM 100 MG: 100 CAPSULE, LIQUID FILLED ORAL at 20:28

## 2017-09-02 RX ADMIN — HYDROXYZINE HYDROCHLORIDE 50 MG: 25 TABLET ORAL at 20:27

## 2017-09-02 RX ADMIN — UMECLIDINIUM 1 PUFF: 62.5 AEROSOL, POWDER ORAL at 08:46

## 2017-09-02 RX ADMIN — DOCUSATE SODIUM 100 MG: 100 CAPSULE, LIQUID FILLED ORAL at 08:33

## 2017-09-02 RX ADMIN — OMEPRAZOLE 40 MG: 20 CAPSULE, DELAYED RELEASE ORAL at 10:21

## 2017-09-02 RX ADMIN — VITAMIN D, TAB 1000IU (100/BT) 1000 UNITS: 25 TAB at 08:33

## 2017-09-02 RX ADMIN — LAMOTRIGINE 100 MG: 100 TABLET ORAL at 08:33

## 2017-09-02 RX ADMIN — GABAPENTIN 300 MG: 300 CAPSULE ORAL at 20:27

## 2017-09-02 RX ADMIN — SERTRALINE HYDROCHLORIDE 25 MG: 25 TABLET ORAL at 08:33

## 2017-09-02 RX ADMIN — GABAPENTIN 300 MG: 300 CAPSULE ORAL at 08:33

## 2017-09-02 RX ADMIN — LAMOTRIGINE 100 MG: 100 TABLET ORAL at 20:28

## 2017-09-02 RX ADMIN — HYDROXYZINE HYDROCHLORIDE 50 MG: 25 TABLET ORAL at 03:13

## 2017-09-02 RX ADMIN — GABAPENTIN 300 MG: 300 CAPSULE ORAL at 13:32

## 2017-09-02 RX ADMIN — Medication 5 MG: at 16:27

## 2017-09-02 RX ADMIN — VITAMIN C 1 TABLET: TAB at 08:33

## 2017-09-02 RX ADMIN — HYDROXYZINE HYDROCHLORIDE 50 MG: 25 TABLET ORAL at 22:16

## 2017-09-02 RX ADMIN — TRAZODONE HYDROCHLORIDE 50 MG: 50 TABLET ORAL at 20:28

## 2017-09-02 RX ADMIN — NICOTINE 1 PATCH: 14 PATCH, EXTENDED RELEASE TRANSDERMAL at 08:46

## 2017-09-02 RX ADMIN — ACETAMINOPHEN 650 MG: 325 TABLET, FILM COATED ORAL at 13:45

## 2017-09-02 RX ADMIN — DOCUSATE SODIUM 100 MG: 100 CAPSULE, LIQUID FILLED ORAL at 13:32

## 2017-09-02 RX ADMIN — FLUTICASONE PROPIONATE 2 SPRAY: 50 SPRAY, METERED NASAL at 08:46

## 2017-09-02 RX ADMIN — Medication 5 MG: at 10:21

## 2017-09-02 RX ADMIN — ACETAMINOPHEN 650 MG: 325 TABLET, FILM COATED ORAL at 03:09

## 2017-09-02 ASSESSMENT — ACTIVITIES OF DAILY LIVING (ADL)
DRESS: SCRUBS (BEHAVIORAL HEALTH);INDEPENDENT
LAUNDRY: UNABLE TO COMPLETE
DRESS: SCRUBS (BEHAVIORAL HEALTH);INDEPENDENT
GROOMING: INDEPENDENT
ORAL_HYGIENE: INDEPENDENT
ORAL_HYGIENE: INDEPENDENT
GROOMING: INDEPENDENT
LAUNDRY: UNABLE TO COMPLETE

## 2017-09-02 NOTE — PLAN OF CARE
Face to face end of shift report received from Violetta SMITH RN. Rounding completed. Patient observed in bed, appears asleep, respirations observed.     Obdulia Valente  9/2/2017  12:13 AM

## 2017-09-02 NOTE — PLAN OF CARE
Problem: General Plan of Care (Inpatient Behavioral)  Goal: Individualization/Patient Specific Goal (IP Behavioral)  The patient and/or their representative will achieve their patient-specific goals related to the plan of care.    The patient-specific goals include: will not have depression upon d/c and will not have S I while admitted and upon d/c and pt will be able to work with provider for a satisfactory med adjustment . Pt will also get help with job situation from s s while here.   Outcome: Improving  Pt has been in bed with eyes closed and regular respirations observed all night. Patient up at 0300 requesting something to drink, something for pain, and to help her sleep. Patient received Tylenol and offered Atarax due to not being able to give Trazodone so late. She accepted both. She is very irritable and short with staff. Patient took medications and threw off her blankets and jumped out of bed. This writer quickly stepped backwards as it looked like patient was trying to jump towards me she got up so quickly. No further issues throughout the night, and patient remained in bed. Will continue to monitor.

## 2017-09-02 NOTE — PROGRESS NOTES
"[facility]  Psychiatric Progress Note    Subjective   This is a 52 year old female with Bipolar 1 disorder here on a 72 hour hold. She had been taking lamictal 100 mg BID and lithium reporting the lithium was making her feel agitated. This was d/c and she was started on topamax partially due to her willingness to take it. Pt reports no adverse side effects thus far. Pt discussed her need to keep busy \"its just how I am\". Asked that she attempt to identify the cause: racing thoughts/attempt to isolate/anxiety/hypomania pt reports never putting any thought into this other than excepting \"its just who I am\". Again asked to consider what is the driving force if any behind these feeling. If it can be identified it may be helpful in adjusting her medication to further control her sx. Denies depression, sleeplessness, paranoia, SI/HI, AH.VH.    10 point ROS negative       DIagnoses:    Bipolar Type 1-current Episode depressed.  Attestation:  Patient has been seen and evaluated by me, MAYRA Harrell CNP, in the presence of the house staff team          Interim History:   The patient's care was discussed with the treatment team and chart notes were reviewed.          Medications:       cholecalciferol (vitamin D) tablet 1,000 Units  1,000 Units Oral Daily     fluticasone  2 spray Both Nostrils Daily     gabapentin (NEURONTIN) capsule 300 mg  300 mg Oral TID     lamoTRIgine (LaMICtal) tablet 100 mg  100 mg Oral BID     nicotine  1 patch Transdermal Q24H     sertraline (ZOLOFT) tablet 25 mg  25 mg Oral Daily     umeclidinium  1 puff Inhalation Daily     vitamin b complex w/vitamin C  1 tablet Oral Daily     nicotine   Transdermal Daily     nicotine   Transdermal Q8H     docusate sodium  100 mg Oral TID     hydrOXYzine, acetaminophen, alum & mag hydroxide-simethicone, magnesium hydroxide, traZODone, OLANZapine **OR** OLANZapine, albuterol, baclofen (LIORESAL) half-tab 5 mg, loratadine-pseudoePHEDrine, " "omeprazole (priLOSEC) CR capsule 40 mg          Allergies:     Allergies   Allergen Reactions     Bee Venom Swelling     Severe and went to hospital per pt.     Gluten Meal Other (See Comments)     Metal [Staples] Hives     Pt reported reaction to \"cheap metals.\"             Psychiatric Examination:   /74  Pulse 73  Temp 97.7  F (36.5  C) (Tympanic)  Resp 14  Ht 5' 3\" (1.6 m)  Wt 120 lb 8 oz (54.7 kg)  SpO2 94%  BMI 21.35 kg/m2  Weight is 120 lbs 8 oz  Body mass index is 21.35 kg/(m^2).    Appearance:  awake, alert and dressed in hospital scrubs  Attitude:  cooperative  Eye Contact:  good  Mood:  better  Affect:  mood congruent  Speech:  clear, coherent  Psychomotor Behavior:  no evidence of tardive dyskinesia, dystonia, or tics  Thought Process:  logical, linear and goal oriented  Associations:  no loose associations  Thought Content:  no evidence of suicidal ideation or homicidal ideation and no evidence of psychotic thought  Insight:  fair  Judgment:  fair  Oriented to:  time, person, and place  Attention Span and Concentration:  intact  Recent and Remote Memory:  fair  Fund of Knowledge: appropriate  Muscle Strength and Tone: normal  Gait and Station: Normal  Perception no perceptual disorder noted         Labs:   No results found for this or any previous visit (from the past 24 hour(s)).          Assessment/ Plan:    Remain on topamax and lamictal.       "
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
## 2017-09-02 NOTE — PLAN OF CARE
Face to face end of shift report received from Obdulia ADAN RN. Rounding completed. Patient observed sitting up in bed.     Kim Argueta  9/2/2017  7:53 AM

## 2017-09-02 NOTE — PLAN OF CARE
Face to face end of shift report received from Kim STEWART RN. Rounding completed. Patient observed in room laying in bed.     Violetta Conklin  9/2/2017  3:51 PM

## 2017-09-03 PROCEDURE — 25000132 ZZH RX MED GY IP 250 OP 250 PS 637: Mod: GY | Performed by: NURSE PRACTITIONER

## 2017-09-03 PROCEDURE — 12400000 ZZH R&B MH

## 2017-09-03 PROCEDURE — A9270 NON-COVERED ITEM OR SERVICE: HCPCS | Mod: GY | Performed by: NURSE PRACTITIONER

## 2017-09-03 PROCEDURE — 99232 SBSQ HOSP IP/OBS MODERATE 35: CPT | Performed by: NURSE PRACTITIONER

## 2017-09-03 RX ORDER — BACLOFEN 10 MG/1
10 TABLET ORAL 3 TIMES DAILY PRN
Status: DISCONTINUED | OUTPATIENT
Start: 2017-09-03 | End: 2017-09-05 | Stop reason: HOSPADM

## 2017-09-03 RX ADMIN — FLUTICASONE PROPIONATE 2 SPRAY: 50 SPRAY, METERED NASAL at 08:35

## 2017-09-03 RX ADMIN — LORATADINE AND PSEUDOEPHEDRINE 1 TABLET: 10; 240 TABLET, EXTENDED RELEASE ORAL at 08:44

## 2017-09-03 RX ADMIN — NICOTINE 1 PATCH: 14 PATCH, EXTENDED RELEASE TRANSDERMAL at 08:34

## 2017-09-03 RX ADMIN — HYDROXYZINE HYDROCHLORIDE 50 MG: 25 TABLET ORAL at 16:39

## 2017-09-03 RX ADMIN — Medication 5 MG: at 08:43

## 2017-09-03 RX ADMIN — HYDROXYZINE HYDROCHLORIDE 50 MG: 25 TABLET ORAL at 20:14

## 2017-09-03 RX ADMIN — DOCUSATE SODIUM 100 MG: 100 CAPSULE, LIQUID FILLED ORAL at 20:13

## 2017-09-03 RX ADMIN — VITAMIN D, TAB 1000IU (100/BT) 1000 UNITS: 25 TAB at 08:36

## 2017-09-03 RX ADMIN — BACLOFEN 10 MG: 10 TABLET ORAL at 16:39

## 2017-09-03 RX ADMIN — UMECLIDINIUM 1 PUFF: 62.5 AEROSOL, POWDER ORAL at 08:35

## 2017-09-03 RX ADMIN — LAMOTRIGINE 100 MG: 100 TABLET ORAL at 20:13

## 2017-09-03 RX ADMIN — SERTRALINE HYDROCHLORIDE 25 MG: 25 TABLET ORAL at 08:36

## 2017-09-03 RX ADMIN — GABAPENTIN 300 MG: 300 CAPSULE ORAL at 20:14

## 2017-09-03 RX ADMIN — GABAPENTIN 300 MG: 300 CAPSULE ORAL at 08:36

## 2017-09-03 RX ADMIN — OMEPRAZOLE 40 MG: 20 CAPSULE, DELAYED RELEASE ORAL at 09:10

## 2017-09-03 RX ADMIN — DOCUSATE SODIUM 100 MG: 100 CAPSULE, LIQUID FILLED ORAL at 14:19

## 2017-09-03 RX ADMIN — GABAPENTIN 300 MG: 300 CAPSULE ORAL at 14:19

## 2017-09-03 RX ADMIN — LAMOTRIGINE 100 MG: 100 TABLET ORAL at 08:36

## 2017-09-03 RX ADMIN — TRAZODONE HYDROCHLORIDE 50 MG: 50 TABLET ORAL at 20:13

## 2017-09-03 RX ADMIN — VITAMIN C 1 TABLET: TAB at 08:36

## 2017-09-03 RX ADMIN — DOCUSATE SODIUM 100 MG: 100 CAPSULE, LIQUID FILLED ORAL at 08:36

## 2017-09-03 ASSESSMENT — ACTIVITIES OF DAILY LIVING (ADL)
LAUNDRY: UNABLE TO COMPLETE
ORAL_HYGIENE: INDEPENDENT
DRESS: SCRUBS (BEHAVIORAL HEALTH);INDEPENDENT
LAUNDRY: UNABLE TO COMPLETE
DRESS: INDEPENDENT;SCRUBS (BEHAVIORAL HEALTH)
GROOMING: INDEPENDENT
GROOMING: INDEPENDENT
ORAL_HYGIENE: INDEPENDENT

## 2017-09-03 NOTE — PROGRESS NOTES
"[facility]  Psychiatric Progress Note    Subjective        This is a 52 year old female with Bipolar 1 disorder here on a 72 hour hold. Megan today reports doing \"OK\". She reports feeling \"abit edgy because of pain\" she generally takes baclofen 15 mg per day and doses it according to her level of activity, generally 1 tab in the am and 1/2 at noon or 2. She has to remain fairly active in order to control her pain \"movement but not too much movement\" is the best to keep it all in balance. I discussed the action of Baclofen with pt, she would be much better off to use 10 mg TID, if she is going to omit one dose the mid day dose would be best.       Discussed the importance of sleep in overall mood, along with reduction of pain and exercise. Pt denies any thoughts of suicide or self harm. She is anxious to return home Tues,transportation will be a problem. Hopefully  can meet with pt first thing tues am a to make plans for her.    10 point ROS negative       DIagnoses:    Bipolar Type 1-current Episode depressed.  Attestation:  Patient has been seen and evaluated by me, Ansley Pearce, MAYRA CNP, in the presence of the house staff team          Interim History:   The patient's care was discussed with the treatment team and chart notes were reviewed.          Medications:       cholecalciferol (vitamin D) tablet 1,000 Units  1,000 Units Oral Daily     fluticasone  2 spray Both Nostrils Daily     gabapentin (NEURONTIN) capsule 300 mg  300 mg Oral TID     lamoTRIgine (LaMICtal) tablet 100 mg  100 mg Oral BID     nicotine  1 patch Transdermal Q24H     sertraline (ZOLOFT) tablet 25 mg  25 mg Oral Daily     umeclidinium  1 puff Inhalation Daily     vitamin b complex w/vitamin C  1 tablet Oral Daily     nicotine   Transdermal Daily     nicotine   Transdermal Q8H     docusate sodium  100 mg Oral TID     hydrOXYzine, acetaminophen, alum & mag hydroxide-simethicone, magnesium hydroxide, traZODone, " "OLANZapine **OR** OLANZapine, albuterol, baclofen (LIORESAL) half-tab 5 mg, loratadine-pseudoePHEDrine, omeprazole (priLOSEC) CR capsule 40 mg          Allergies:     Allergies   Allergen Reactions     Bee Venom Swelling     Severe and went to hospital per pt.     Gluten Meal Other (See Comments)     Metal [Staples] Hives     Pt reported reaction to \"cheap metals.\"             Psychiatric Examination:   /58  Pulse 66  Temp 98.5  F (36.9  C) (Tympanic)  Resp 12  Ht 5' 3\" (1.6 m)  Wt 123 lb 10.9 oz (56.1 kg)  SpO2 96%  BMI 21.91 kg/m2  Weight is 123 lbs 10.85 oz  Body mass index is 21.91 kg/(m^2).    Appearance:  awake, alert and dressed in hospital scrubs  Attitude:  cooperative  Eye Contact:  good  Mood:  better \"I'm making this to be a good thing\"  Affect:  mood congruent  Speech:  clear, coherent  Psychomotor Behavior:  no evidence of tardive dyskinesia, dystonia, or tics  Thought Process:  logical, linear and goal oriented  Associations:  no loose associations  Thought Content:  no evidence of suicidal ideation or homicidal ideation and no evidence of psychotic thought  Insight:  fair  Judgment:  fair  Oriented to:  time, person, and place  Attention Span and Concentration:  intact  Recent and Remote Memory:  fair  Fund of Knowledge: appropriate  Muscle Strength and Tone: normal  Gait and Station: Normal  Perception no perceptual disorder noted         Labs:   No results found for this or any previous visit (from the past 24 hour(s)).          Assessment/ Plan:    Increase Baclofen to 10 mg TID       "

## 2017-09-03 NOTE — PLAN OF CARE
"Problem: General Plan of Care (Inpatient Behavioral)  Goal: Individualization/Patient Specific Goal (IP Behavioral)  The patient and/or their representative will achieve their patient-specific goals related to the plan of care.    The patient-specific goals include: will not have depression upon d/c and will not have S I while admitted and upon d/c and pt will be able to work with provider for a satisfactory med adjustment . Pt will also get help with job situation from s s while here.   Outcome: Improving  Patient up and showered at start of shift. Pleasant and cooperative. Flat/blunted affect. Mood euthymic. Denies SI/HI or hallucinations. Appetite good. Reports having BM last night and this morning. Medicated with baclofen 5mg at 0843 for 5/10 bilateral arm pain with decrease in pain 3/10. Medicated with claritin 5mg at 0843 and prilosec at 0910. Patient reported \"I just want to set the record straight. I went to the ER for med management. I was feeling depressed and was in pain and I wanted them to help adjust my medications\". Reports the ER doctor told me her he was going to discharge her at 7:00pm and she wanted to stay as they hadn't done anything with her medications. She stated that the ER provider told her \"There is no reason for you to stay. It's not like you said you were suicidal\". She reports that she then said she was suicidal so she could stay. We discussed that med management is done with her PCP or Psychiatrist, not the ER and she said she wasn't aware of this. She states she has improvement with her muscle spasticity with baclofen 10mg. She was previously on 1/2 tab (5mg) three times daily and she had to take 2-half tabs (10mg) in the AM and 1/2 in the afternoon. She was encouraged to discuss this with NP.      1445: Patient attended afternoon group and was spending time working on puzzles in [employer].  "

## 2017-09-03 NOTE — PLAN OF CARE
Problem: General Plan of Care (Inpatient Behavioral)  Goal: Individualization/Patient Specific Goal (IP Behavioral)  The patient and/or their representative will achieve their patient-specific goals related to the plan of care.    The patient-specific goals include: will not have depression upon d/c and will not have S I while admitted and upon d/c and pt will be able to work with provider for a satisfactory med adjustment . Pt will also get help with job situation from s s while here.   Outcome: Improving  Pt has been in bed with eyes closed and regular respirations observed all night. Will continue to monitor.

## 2017-09-03 NOTE — PLAN OF CARE
Face to face end of shift report received from Obdulia ADAN RN.  Rounding completed. Patient observed sitting in patient lounge.     Kim Argueta  9/3/2017  7:51 AM

## 2017-09-03 NOTE — PLAN OF CARE
Problem: General Plan of Care (Inpatient Behavioral)  Goal: Individualization/Patient Specific Goal (IP Behavioral)  The patient and/or their representative will achieve their patient-specific goals related to the plan of care.    The patient-specific goals include: will not have depression upon d/c and will not have S I while admitted and upon d/c and pt will be able to work with provider for a satisfactory med adjustment . Pt will also get help with job situation from s s while here.   Outcome: Improving  Pt out on unit walking around but is withdrawn from others. Pt is short with answers to questions. Pt denies all criteria during initial assessment. Pt does report pain continuous and asks for pain medication upon initial assessment. Pt was given baclofen which patient states was effective. Pt is cooperative and med compliant. She does not attend groups she states that she continues to feel uncomfortable talking about her problems with a group of people. Pt does isolate to her room throughout most of this shift. Pt is currently in bed sleeping. Pt did receive Trazodone and atarax at 2027 to assist with sleeping and decrease anxiety s/t hx of feet and arm pain.

## 2017-09-04 VITALS
WEIGHT: 123.68 LBS | HEART RATE: 73 BPM | HEIGHT: 63 IN | DIASTOLIC BLOOD PRESSURE: 67 MMHG | SYSTOLIC BLOOD PRESSURE: 139 MMHG | OXYGEN SATURATION: 96 % | RESPIRATION RATE: 15 BRPM | TEMPERATURE: 98.3 F | BODY MASS INDEX: 21.91 KG/M2

## 2017-09-04 PROCEDURE — A9270 NON-COVERED ITEM OR SERVICE: HCPCS | Mod: GY | Performed by: NURSE PRACTITIONER

## 2017-09-04 PROCEDURE — 12400000 ZZH R&B MH

## 2017-09-04 PROCEDURE — 25000132 ZZH RX MED GY IP 250 OP 250 PS 637: Mod: GY | Performed by: NURSE PRACTITIONER

## 2017-09-04 PROCEDURE — 99232 SBSQ HOSP IP/OBS MODERATE 35: CPT | Performed by: NURSE PRACTITIONER

## 2017-09-04 RX ORDER — BACLOFEN 10 MG/1
10 TABLET ORAL 3 TIMES DAILY PRN
Qty: 90 TABLET | Refills: 0 | Status: SHIPPED | OUTPATIENT
Start: 2017-09-04

## 2017-09-04 RX ORDER — SERTRALINE HYDROCHLORIDE 25 MG/1
25 TABLET, FILM COATED ORAL DAILY
Qty: 30 TABLET | Refills: 0 | Status: SHIPPED | OUTPATIENT
Start: 2017-09-04

## 2017-09-04 RX ORDER — LAMOTRIGINE 100 MG/1
100 TABLET ORAL 2 TIMES DAILY
Qty: 60 TABLET | Refills: 0 | Status: SHIPPED | OUTPATIENT
Start: 2017-09-04

## 2017-09-04 RX ADMIN — UMECLIDINIUM 1 PUFF: 62.5 AEROSOL, POWDER ORAL at 08:20

## 2017-09-04 RX ADMIN — BACLOFEN 10 MG: 10 TABLET ORAL at 12:39

## 2017-09-04 RX ADMIN — ACETAMINOPHEN 650 MG: 325 TABLET, FILM COATED ORAL at 16:37

## 2017-09-04 RX ADMIN — DOCUSATE SODIUM 100 MG: 100 CAPSULE, LIQUID FILLED ORAL at 08:19

## 2017-09-04 RX ADMIN — VITAMIN C 1 TABLET: TAB at 08:19

## 2017-09-04 RX ADMIN — DOCUSATE SODIUM 100 MG: 100 CAPSULE, LIQUID FILLED ORAL at 13:26

## 2017-09-04 RX ADMIN — TRAZODONE HYDROCHLORIDE 50 MG: 50 TABLET ORAL at 20:35

## 2017-09-04 RX ADMIN — GABAPENTIN 300 MG: 300 CAPSULE ORAL at 20:27

## 2017-09-04 RX ADMIN — SERTRALINE HYDROCHLORIDE 25 MG: 25 TABLET ORAL at 08:20

## 2017-09-04 RX ADMIN — BACLOFEN 10 MG: 10 TABLET ORAL at 16:37

## 2017-09-04 RX ADMIN — LORATADINE AND PSEUDOEPHEDRINE 1 TABLET: 10; 240 TABLET, EXTENDED RELEASE ORAL at 09:00

## 2017-09-04 RX ADMIN — NICOTINE 1 PATCH: 14 PATCH, EXTENDED RELEASE TRANSDERMAL at 08:19

## 2017-09-04 RX ADMIN — BACLOFEN 10 MG: 10 TABLET ORAL at 05:24

## 2017-09-04 RX ADMIN — GABAPENTIN 300 MG: 300 CAPSULE ORAL at 08:19

## 2017-09-04 RX ADMIN — HYDROXYZINE HYDROCHLORIDE 50 MG: 25 TABLET ORAL at 05:24

## 2017-09-04 RX ADMIN — LAMOTRIGINE 100 MG: 100 TABLET ORAL at 08:20

## 2017-09-04 RX ADMIN — OMEPRAZOLE 40 MG: 20 CAPSULE, DELAYED RELEASE ORAL at 12:29

## 2017-09-04 RX ADMIN — VITAMIN D, TAB 1000IU (100/BT) 1000 UNITS: 25 TAB at 08:19

## 2017-09-04 RX ADMIN — LAMOTRIGINE 100 MG: 100 TABLET ORAL at 20:27

## 2017-09-04 RX ADMIN — FLUTICASONE PROPIONATE 2 SPRAY: 50 SPRAY, METERED NASAL at 08:20

## 2017-09-04 RX ADMIN — DOCUSATE SODIUM 100 MG: 100 CAPSULE, LIQUID FILLED ORAL at 20:27

## 2017-09-04 RX ADMIN — GABAPENTIN 300 MG: 300 CAPSULE ORAL at 13:26

## 2017-09-04 ASSESSMENT — ACTIVITIES OF DAILY LIVING (ADL)
GROOMING: INDEPENDENT
LAUNDRY: UNABLE TO COMPLETE
GROOMING: INDEPENDENT;SHOWER
ORAL_HYGIENE: INDEPENDENT
DRESS: INDEPENDENT;SCRUBS (BEHAVIORAL HEALTH)
DRESS: INDEPENDENT;SCRUBS (BEHAVIORAL HEALTH)

## 2017-09-04 NOTE — PLAN OF CARE
Face to face end of shift report received from PIERO Shipley. Rounding completed. Patient observed.     Adrienne Kay  9/4/2017  5:18 PM

## 2017-09-04 NOTE — PLAN OF CARE
Problem: General Plan of Care (Inpatient Behavioral)  Goal: Individualization/Patient Specific Goal (IP Behavioral)  The patient and/or their representative will achieve their patient-specific goals related to the plan of care.    The patient-specific goals include: will not have depression upon d/c and will not have S I while admitted and upon d/c and pt will be able to work with provider for a satisfactory med adjustment . Pt will also get help with job situation from s s while here.   Outcome: Improving  Pt denies SI, HI, anxiety, and depression. Pt is asking to go home early on Tuesday morning,however pt is also asking to get set up with a female therapist in [place] hometown. Pt would like to have this set up for her prior to leaving here. Nurse did offer to look up the phone number for the facility with a female therapist, pt states, I will set up my appointment when they call me. Nurse will have a further discussion with patient that she will need to arrange her appointment unless she waits until Wednesday morning to discharge as the clinics are closed today due to the holiday. Pt is calm, cooperative and med compliant. Pt is not attending groups this shift as she states that she is unable to discuss her feelings in large groups,it takes her time to trust and open up to someone.

## 2017-09-04 NOTE — PLAN OF CARE
Problem: General Plan of Care (Inpatient Behavioral)  Goal: Individualization/Patient Specific Goal (IP Behavioral)  The patient and/or their representative will achieve their patient-specific goals related to the plan of care.    The patient-specific goals include: will not have depression upon d/c and will not have S I while admitted and upon d/c and pt will be able to work with provider for a satisfactory med adjustment . Pt will also get help with job situation from s s while here.   Outcome: Improving  Pt is pleasant and talkative today. Pt does request pain and anxiety medication upon initial assessment for pain and anxiety 7/10. Pt did receive Baclofen 10mg and Atarax 50 mg for anxiety at 1640. Pt states this was helpful decreasing both anxiety and depression. Pt does work on a puzzle but is withdrawn from others. Pt does spend a great amount of her time in her room this afternoon. Pt did request atarax and Trazodone at bedtime to reduce anxiety 7/10 and to assist with sleep. Nurse did administer Trazodone 50 mg and Atarax 50 mg at 2014. Pt is currently in her room and appears to be sleeping.

## 2017-09-04 NOTE — PLAN OF CARE
Problem: Goal Outcome Summary  Goal: Goal Outcome Summary  Outcome: Improving  Pt has been in bed with eyes closed and regular respirations observed all night. Patient up at 0515 requesting something for pain that works a little better than Tylenol. She reported her right  arm is sore, describes it as pinching. She also reported anxiety and requested Atarax. She received Baclofen and Atarax at 0526. She returned to her bedroom, resting in bed. Will continue to monitor.

## 2017-09-04 NOTE — PLAN OF CARE
Face to face end of shift report received from Kim STEWART RN. Rounding completed. Patient observed.     Violetta Conklin  9/3/2017  04:20 PM

## 2017-09-04 NOTE — PROGRESS NOTES
"[facility]  Psychiatric Progress Note    Subjective       This is my first time working with Megan this hospitalization.  She states she has improved significant and her anxiety and depression are much lower.  She denies any suicidal  thoughts and denies racing thoughts.she is asking to be discharged tomorrow. She would like therapy set up prior to her departire though offices are closed today.  She does not appear to be preoccupied.  She is bright though not euphoric.    10 point ROS negative       DIagnoses:    Bipolar Type 1-current Episode depressed.  Attestation:  Patient has been seen and evaluated by me, Diamond Fierro NP, in the presence of the house staff team          Interim History:   The patient's care was discussed with the treatment team and chart notes were reviewed.          Medications:       cholecalciferol (vitamin D) tablet 1,000 Units  1,000 Units Oral Daily     fluticasone  2 spray Both Nostrils Daily     gabapentin (NEURONTIN) capsule 300 mg  300 mg Oral TID     lamoTRIgine (LaMICtal) tablet 100 mg  100 mg Oral BID     nicotine  1 patch Transdermal Q24H     sertraline (ZOLOFT) tablet 25 mg  25 mg Oral Daily     umeclidinium  1 puff Inhalation Daily     vitamin b complex w/vitamin C  1 tablet Oral Daily     nicotine   Transdermal Daily     nicotine   Transdermal Q8H     docusate sodium  100 mg Oral TID     baclofen (LIORESAL) tablet 10 mg, hydrOXYzine, acetaminophen, alum & mag hydroxide-simethicone, magnesium hydroxide, traZODone, OLANZapine **OR** OLANZapine, albuterol, loratadine-pseudoePHEDrine, omeprazole (priLOSEC) CR capsule 40 mg          Allergies:     Allergies   Allergen Reactions     Bee Venom Swelling     Severe and went to hospital per pt.     Gluten Meal Other (See Comments)     Metal [Staples] Hives     Pt reported reaction to \"cheap metals.\"             Psychiatric Examination:   /76  Pulse 76  Temp 98  F (36.7  C) (Tympanic)  Resp 14  Ht 1.6 " "m (5' 3\")  Wt 56.1 kg (123 lb 10.9 oz)  SpO2 96%  BMI 21.91 kg/m2  Weight is 123 lbs 10.85 oz  Body mass index is 21.91 kg/(m^2).    Appearance:  awake, alert and dressed in hospital scrubs  Attitude:  cooperative  Eye Contact:  good  Mood:  bright  Affect:  mood congruent  Speech:  clear, coherent  Psychomotor Behavior:  no evidence of tardive dyskinesia, dystonia, or tics  Thought Process:  logical, linear and goal oriented  Associations:  no loose associations  Thought Content:  no evidence of suicidal ideation or homicidal ideation and no evidence of psychotic thought  Insight:  fair  Judgment:  fair  Oriented to:  time, person, and place  Attention Span and Concentration:  intact  Recent and Remote Memory:  fair  Fund of Knowledge: appropriate  Muscle Strength and Tone: normal  Gait and Station: Normal  Perception no perceptual disorder noted         Labs:   No results found for this or any previous visit (from the past 24 hour(s)).          Assessment/ Plan:    dc tomorrow am via bus [facility]"

## 2017-09-04 NOTE — PLAN OF CARE
Face to face end of shift report received from Obdulia CASTRO RN. Rounding completed. Patient observed in [facility].     Violetta Conklin  9/4/2017  7:54 AM

## 2017-09-04 NOTE — PLAN OF CARE
Face to face end of shift report received from Violetta SMITH RN. Rounding completed. Patient observed in bed, appears asleep, respirations observed.     Obdulia Valente  9/4/2017  12:09 AM

## 2017-09-05 LAB — DEPRECATED CALCIDIOL+CALCIFEROL SERPL-MC: 38 UG/L (ref 20–75)

## 2017-09-05 PROCEDURE — 25000132 ZZH RX MED GY IP 250 OP 250 PS 637: Mod: GY | Performed by: NURSE PRACTITIONER

## 2017-09-05 PROCEDURE — A9270 NON-COVERED ITEM OR SERVICE: HCPCS | Mod: GY | Performed by: NURSE PRACTITIONER

## 2017-09-05 PROCEDURE — 99238 HOSP IP/OBS DSCHRG MGMT 30/<: CPT | Performed by: NURSE PRACTITIONER

## 2017-09-05 RX ADMIN — BACLOFEN 10 MG: 10 TABLET ORAL at 02:16

## 2017-09-05 RX ADMIN — LAMOTRIGINE 100 MG: 100 TABLET ORAL at 07:09

## 2017-09-05 RX ADMIN — GABAPENTIN 300 MG: 300 CAPSULE ORAL at 07:09

## 2017-09-05 RX ADMIN — ACETAMINOPHEN 650 MG: 325 TABLET, FILM COATED ORAL at 02:15

## 2017-09-05 NOTE — PLAN OF CARE
"Problem: General Plan of Care (Inpatient Behavioral)  Goal: Individualization/Patient Specific Goal (IP Behavioral)  The patient and/or their representative will achieve their patient-specific goals related to the plan of care.    The patient-specific goals include: will not have depression upon d/c and will not have S I while admitted and upon d/c and pt will be able to work with provider for a satisfactory med adjustment . Pt will also get help with job situation from s s while here.   Patient will sleep 6-8 hours a night  Outcome: Adequate for Discharge Date Met:  09/05/17  Slept restlessly this night - is excited about discharging in the am - expressed her thanks for providing an early breakfast and bag lunch - administered tylenol 650 mg po and baclofen 10 mg po per pt request at 0215 for \"my elbow feels pinched inside\" she rated her pain at \"7\" - then asked for and received a snack of sharan crackers and milk. Asked to be awoke at 0500 to prepare for discharge.      "

## 2017-09-05 NOTE — PLAN OF CARE
Problem: General Plan of Care (Inpatient Behavioral)  Goal: Individualization/Patient Specific Goal (IP Behavioral)  The patient and/or their representative will achieve their patient-specific goals related to the plan of care.    The patient-specific goals include: will not have depression upon d/c and will not have S I while admitted and upon d/c and pt will be able to work with provider for a satisfactory med adjustment . Pt will also get help with job situation from s s while here.   Syl is up on unit and did attend group this evening. Denied SI, HI and hallucinations. Is anxious and feels ready for discharge tomorrow, stated she has gotten better since being on the unit. Plans to  medications on her return to home tomorrow and make follow-up therapy appt at that time. Has c/o toe pain and asks for PRN as needed. Pt agrees with medications as scheduled, had no further questions.

## 2017-09-05 NOTE — DISCHARGE INSTRUCTIONS
"Discharge Note    Patient Discharged to home on 9/5/2017 7:27 AM via No transportation concerns via [employer] accompanied by .     Patient informed of discharge instructions in AVS. patient verbalizes understanding and denies having any questions pertaining to AVS. Patient stable at time of discharge. Patient denies SI, HI, and thoughts of self harm at time of discharge. All personal belongings returned to patient. Discharge prescriptions sent to [place], [place] via electronic communication. Psych evaluation, history and physical, AVS, and discharge summary faxed to next level of care-   will take care of this - pt does have a copy and has signed for.  Did administer Lamictal and Neuronton  - as ordered for am and pt requested..     Adelina HOGita Melendez  9/5/2017  7:27 AM  Discharge .     { :2331475} discharge instructions in AVS. {patient...:071036} verbalizes understanding and denies having any questions pertaining to AVS. Patient stable at time of discharge. Patient denies SI, HI, and thoughts of self harm at time of discharge. All personal belongings returned to patient. Discharge prescriptions sent to *** via {communication:813227}. Psych evaluation, history and physical, AVS, and discharge summary faxed to next level of care- ***.     Adelina LGita Melendez  9/5/2017  7:27 AM  Discharge Note    Patient { :1860078::\"Discharged to home\"} on 9/5/2017 7:28 AM via {Transportation Mode:194136} accompanied by ***.     { :3664966} discharge instructions in AVS. {patient...:152988} verbalizes understanding and denies having any questions pertaining to AVS. Patient stable at time of discharge. Patient denies SI, HI, and thoughts of self harm at time of discharge. All personal belongings returned to patient. Discharge prescriptions sent to *** via {communication:540952}. Psych evaluation, history and physical, AVS, and discharge summary faxed to next level of care- ***.     Adelina MCKEON" Al  9/5/2017  7:28 AM  Behavioral Discharge Planning and Instructions     Summary: Megan was admitted to  with increased depression.     Main Diagnosis: bipolar type I disorder-current episode manic.     Major Treatments, Procedures and Findings: Stabilize with medications, connect with community programs.      Symptoms to Report: feeling more aggressive, increased confusion, losing more sleep, mood getting worse or thoughts of suicide     Lifestyle Adjustment: Take all medications as prescribed, meet with doctor/ medication provider, out patient therapist, , and [facility] worker as scheduled. Abstain from alcohol or any unprescribed drug    IF YOU SHOULD HAVE ANY QUESTIONS OR CONCERNS PLEASE CALL THE UNIT DIRECTLY AT [phone] AND ASK FOR A NURSE             Psychiatry Follow-up:      [facility]  PCP- Alexander Mayfield - September 13th @ 11:00 (in the [place] office)   [address] [place] [address]  Phone: [phone]   Fax: [phone]    [facility]  [facility] -   Therapy -    [address] [place] [address]  Phone: [phone]  Fax: [phone]     Mobile Crisis Team/ Crisis Hotline: Call 1-237.683.2816 to reach [facility] Crisis Response.  [facility]  [address] [place], [place]  Phone: [phone]        Resources:   Crisis Intervention: 810.405.3224 or 651-518-5784 (TTY: 829.328.5998).  Call anytime for help.  National [place] on Mental Illness (www.[place].chastity.org): 937.219.2566 or 448-365-9776.  Alcoholics Anonymous (www.alcoholics-anonymous.org): Check your phone book for your local chapter.  Suicide Awareness Voices of Education (SAVE) (www.save.org): 391-561-LTDA (8227)  National Suicide Prevention Line (www.mentalhealthmn.org): 982-812-IUTD (9572)  Mental Health Consumer/Survivor Network of [place] (www.mhcsn.net): 909.713.6597 or 548-023-3472  Mental Health Association of [place] (www.mentalhealth.org): 935.427.4040 or  646.322.2675     General Medication Instructions:   See your medication sheet(s) for instructions.   Take all medicines as directed.  Make no changes unless your doctor suggests them.   Go to all your doctor visits.  Be sure to have all your required lab tests. This way, your medicines can be refilled on time.  Do not use any drugs not prescribed by your doctor.  Avoid alcohol.     [place] Area:  [facility], Crisis stabilization [facility]- [phone]  [facility] Crisis Line: 1-822.842.5718  Advocates For Family Peace: 678-1033  Sexual Assault Program [facility]: 197.894.8600 or 1-588.714.2380  [facility]attered Women[facility] Program: 1-205.780.9931 Ext: 279       Calls answered Mon-Fri-8:00 am--4:30 pm     [place]:  [employer] for Family Peace: 1-600.932.8708  [facility] first call for help: 4-994-237-6199  [facility]:  (690) 502-3988        [place] Area:  Warm Line: 1-935.560.4344       Calls answered Tuesday--Saturday 4:00 pm--10:00 pm  [facility] Crisis Line - 790.684.3867  [facility] Stabil[facility] 330-324-9944     [place] Statewide:  [place] Crisis and Referral Services: 1-603.354.4905  National Suicide Prevention Lifeline: 9-415-642-TALK (1707)   - wid7oswa- Text  Life  to [address]  First Call for Help: 2-1-1  LY Helpline- 3-335-WYNT-HELP

## 2017-09-05 NOTE — PLAN OF CARE
"Problem: Goal Outcome Summary  Goal: Goal Outcome Summary  Outcome: Adequate for Discharge Date Met:  09/05/17  Spoke with pt at length when up for a snack - is ready and feels well enough to return home.  We did speak about her applying for section 8 as early as possible - as she is single and it may take longer than it would if she had dependents.  - is \"bored\"  In [place], [place] and would like to move to a busier more active area - does not want to leave [place] or move \"to far away\" \"just somewhere there is more than a dollar store\"  Pleasant, polite and appropriate.      "

## 2017-09-05 NOTE — PLAN OF CARE
Problem: General Plan of Care (Inpatient Behavioral)  Goal: Individualization/Patient Specific Goal (IP Behavioral)  The patient and/or their representative will achieve their patient-specific goals related to the plan of care.    The patient-specific goals include: will not have depression upon d/c and will not have S I while admitted and upon d/c and pt will be able to work with provider for a satisfactory med adjustment . Pt will also get help with job situation from s s while here.   Patient will sleep 6-8 hours a night   Outcome: Adequate for Discharge Date Met:  09/05/17  Slept for approximately 6 - 7 hours - a bit restless r/t excitement of discharge this morning.  Asked for a 0500 wake up call and staff will do that now.

## 2017-09-05 NOTE — PLAN OF CARE
Problem: General Plan of Care (Inpatient Behavioral)  Goal: Patient Schedule  Patient s Schedule:   Occupational Therapy Discharge Summary     Reason for therapy discharge:    Discharged to home.     Progress towards therapy goal(s). See goals on Care Plan in [facility] electronic health record for goal details.  Goals partially met.  Barriers to achieving goals:   discharge from facility.     Therapy recommendation(s):    recommend outpatient services for mental health management

## 2017-09-05 NOTE — DISCHARGE SUMMARY
Psychiatric Discharge Summary    Megan Godinez MRN# [id number]   Age: 52 year old YOB: 1965     Date of Admission:  8/31/2017  Date of Discharge:  9/5/2017  7:40 AM  Admitting Physician:  Ricardo Pena MD  Discharge Physician:  Diamond Fierro NP          Event Leading to Hospitalization and Hospital Stay   Megan Godinez is a 52 year old single  female who presented via [facility] for the second time in two days with ongoing depression. she did report SI with no plan and did not feel safe at home.. Syl reported that she was also more depressed due to losing her car and a job recently so she has had a lot more down time. She tells me she is used to keeping busy so not doing anything is very difficult for her. While here, her lithium was stopped. She reported that it made her feel more depressed. She was a bit disheveled upon arrival. She appeared depressed and had psychomotor retardation. zoloft was started which she tolerated well. She was attending groups and denied further SI. She is much brighter and sleeping well. Her hygiene has improved. She has no SI.  At time of discharge, there is no evidence that patient is in immediate danger of self or others.        DIagnoses:     Bipolar Type 1-current Episode depressed.         Labs:     Results for orders placed or performed during the hospital encounter of 08/31/17   Vitamin B12   Result Value Ref Range    Vitamin B12 653 193 - 986 pg/mL   Folate   Result Value Ref Range    Folate 7.4 >5.4 ng/mL                    Discharge Medications:     Discharge Medication List as of 9/5/2017  6:19 AM      CONTINUE these medications which have CHANGED    Details   lamoTRIgine (LAMICTAL) 100 MG tablet Take 1 tablet (100 mg) by mouth 2 times daily, Disp-60 tablet, R-0, E-Prescribe      sertraline (ZOLOFT) 25 MG tablet Take 1 tablet (25 mg) by mouth daily, Disp-30 tablet, R-0, E-Prescribe      baclofen (LIORESAL) 10 MG tablet Take 1  tablet (10 mg) by mouth 3 times daily as needed for muscle spasms, Disp-90 tablet, R-0, E-Prescribe         CONTINUE these medications which have NOT CHANGED    Details   nicotine (NICODERM CQ) 14 MG/24HR 24 hr patch Place 1 patch onto the skin every 24 hours, Historical      GABAPENTIN PO Take 300 mg by mouth 3 times daily, Historical      vitamin B complex with vitamin C (VITAMIN  B COMPLEX) TABS tablet Take 1 tablet by mouth daily, Historical      tiotropium (SPIRIVA RESPIMAT) 2.5 MCG/ACT inhalation aerosol Inhale 1 puff into the lungs daily, Historical      albuterol (2.5 MG/3ML) 0.083% neb solution Inhale 1 vial into the lungs every 4 hours as needed for shortness of breath / dyspnea or wheezing (1 puff q4hrs PRN), Historical      OMEPRAZOLE PO Take 40 mg by mouth daily as needed , Historical      fluticasone (FLONASE) 50 MCG/ACT nasal spray Spray 2 sprays into both nostrils daily, Historical      Alendronate Sodium (FOSAMAX PO) Take 70 mg by mouth once a week Patient takes every Sunday, Historical      loratadine-pseudoePHEDrine (CLARITIN-D 24-HOUR)  MG per tablet Take 1 tablet by mouth daily as needed for allergies, Historical      docusate sodium (STOOL SOFTENER) 100 MG tablet Take 100 mg by mouth, Historical      Cholecalciferol (VITAMIN D3 PO) Take 1,000 Units by mouth daily, Historical         STOP taking these medications       lithium (ESKALITH/LITHOBID) 300 MG CR tablet Comments:   Reason for Stopping:                        Psychiatric Examination:   Appearance:  awake, alert  Attitude:  cooperative  Eye Contact:  good  Mood:  better  Affect:  appropriate and in normal range  Speech:  clear, coherent  Psychomotor Behavior:  no evidence of tardive dyskinesia, dystonia, or tics  Thought Process:  logical and goal oriented  Associations:  no loose associations  Thought Content:  no evidence of suicidal ideation or homicidal ideation, no evidence of psychotic thought and no auditory hallucinations  present  Insight:  good  Judgment:  intact  Oriented to:  time, person, and place  Attention Span and Concentration:  intact  Recent and Remote Memory:  intact  Fund of Knowledge: low-normal  Muscle Strength and Tone: normal  Gait and Station: Normal  Perception:        Discharge Plan:         Behavioral Discharge Planning and Instructions      Summary: Megan was admitted to  with increased depression.      Main Diagnosis: bipolar type I disorder-current episode manic.      Major Treatments, Procedures and Findings: Stabilize with medications, connect with community programs.       Symptoms to Report: feeling more aggressive, increased confusion, losing more sleep, mood getting worse or thoughts of suicide      Lifestyle Adjustment: Take all medications as prescribed, meet with doctor/ medication provider, out patient therapist, , and [facility] worker as scheduled. Abstain from alcohol or any unprescribed drug     IF YOU SHOULD HAVE ANY QUESTIONS OR CONCERNS PLEASE CALL THE UNIT DIRECTLY AT [phone] AND ASK FOR A NURSE                  Psychiatry Follow-up:       [facility]  PCP- Alexander Mayfield - September 13th @ 11:00 (in the [place] office)   [address] [place] [address]  Phone: [phone]   Fax: [phone]     Safe Transitions  [facility] -   Therapy -    [address] [place] [address]  Phone: [phone]  Fax: [phone]      Mobile Crisis Team/ Crisis Hotline: Call 1-678.794.5262 to reach [facility] Crisis Response.  [facility]  [address] [place], [place]  Phone: [phone]      Attestation:  The patient has been seen and evaluated by me,  April Olivia Fierro NP         Discharge Services Provided:    30 minutes spent on discharge services, including:  Final examination of patient.  Review and discussion of Hospital stay.  Instructions for continued outpatient care/goals.  Preparation of discharge records.  Preparation of  medications refills and new prescriptions.  Preparation of Applicable referral forms.

## 2017-09-05 NOTE — PLAN OF CARE
Face to face end of shift report received from kiki Deleon . Rounding completed. Patient observed. Lying on left side - eyes closed - non-labored breathing noted. - is being discharged genna. On the early bus - medications in the med room - did speak to the guards at midnight and they will bring her belongings up from the safe - as she would like her phone charged.  An early breakfast and a bag lunch have been ordered - and her tickets are in the nurses station.    Adelina Melendez  9/5/2017  1:25 AM

## 2017-09-05 NOTE — PLAN OF CARE
Problem: General Plan of Care (Inpatient Behavioral)  Goal: Plan of Care Review (Adult,OB,Behavioral)  The patient and/or their representative will communicate an understanding of their plan of care.   Outcome: Adequate for Discharge Date Met:  09/05/17  Am sending medications that pt brought into the hospital with her - Lamotrigine, Gabapentin/empty bottle of Baclofen/Fluticasone Nasal Spray/ProAir Inhaler/ and from personal bin in nurses station - one ClaritinD/one Incruse Inhaler an Flonase Nasal Spray - her medications have been e-scribed to [facility] on [place], [place] pt aware of this.

## 2021-06-28 NOTE — PLAN OF CARE
OT 6C. Cancel. Pt declining therapy this AM despite encouragement. Will reschedule OT/CR evaluation.    Problem: Discharge Planning  Goal: Discharge Planning (Adult, OB, Behavioral, Peds)  Outcome: Adequate for Discharge Date Met:  02/24/17  Pt is discharging at the recommendation of the treatment team. Pt is discharging to home transported by [facility] . Pt denies having any thoughts of hurting themself or anyone else. Pt denies anxiety or depression. Pt has follow up with [facility]. Discharge instructions, including; demographic sheet, psychiatric evaluation, discharge summary, and AVS were faxed to these next level of care providers by discharge planner.
